# Patient Record
Sex: FEMALE | Race: WHITE | NOT HISPANIC OR LATINO | Employment: FULL TIME | URBAN - METROPOLITAN AREA
[De-identification: names, ages, dates, MRNs, and addresses within clinical notes are randomized per-mention and may not be internally consistent; named-entity substitution may affect disease eponyms.]

---

## 2023-08-24 ENCOUNTER — LAB (OUTPATIENT)
Dept: LAB | Facility: LAB | Age: 58
End: 2023-08-24
Payer: COMMERCIAL

## 2023-08-24 ENCOUNTER — OFFICE VISIT (OUTPATIENT)
Dept: PRIMARY CARE | Facility: CLINIC | Age: 58
End: 2023-08-24
Payer: COMMERCIAL

## 2023-08-24 VITALS
SYSTOLIC BLOOD PRESSURE: 188 MMHG | OXYGEN SATURATION: 95 % | BODY MASS INDEX: 32.02 KG/M2 | WEIGHT: 174 LBS | DIASTOLIC BLOOD PRESSURE: 93 MMHG | HEIGHT: 62 IN | HEART RATE: 92 BPM

## 2023-08-24 DIAGNOSIS — Z12.31 ENCOUNTER FOR SCREENING MAMMOGRAM FOR MALIGNANT NEOPLASM OF BREAST: ICD-10-CM

## 2023-08-24 DIAGNOSIS — R53.83 OTHER FATIGUE: ICD-10-CM

## 2023-08-24 DIAGNOSIS — Z12.11 ENCOUNTER FOR SCREENING FOR MALIGNANT NEOPLASM OF COLON: ICD-10-CM

## 2023-08-24 DIAGNOSIS — E78.5 HYPERLIPIDEMIA, UNSPECIFIED HYPERLIPIDEMIA TYPE: ICD-10-CM

## 2023-08-24 DIAGNOSIS — I10 PRIMARY HYPERTENSION: ICD-10-CM

## 2023-08-24 DIAGNOSIS — I10 PRIMARY HYPERTENSION: Primary | ICD-10-CM

## 2023-08-24 LAB
ALANINE AMINOTRANSFERASE (SGPT) (U/L) IN SER/PLAS: 26 U/L (ref 7–45)
ALBUMIN (G/DL) IN SER/PLAS: 4.5 G/DL (ref 3.4–5)
ALKALINE PHOSPHATASE (U/L) IN SER/PLAS: 101 U/L (ref 33–110)
ANION GAP IN SER/PLAS: 16 MMOL/L (ref 10–20)
ASPARTATE AMINOTRANSFERASE (SGOT) (U/L) IN SER/PLAS: 24 U/L (ref 9–39)
BASOPHILS (10*3/UL) IN BLOOD BY AUTOMATED COUNT: 0.04 X10E9/L (ref 0–0.1)
BASOPHILS/100 LEUKOCYTES IN BLOOD BY AUTOMATED COUNT: 0.4 % (ref 0–2)
BILIRUBIN TOTAL (MG/DL) IN SER/PLAS: 0.5 MG/DL (ref 0–1.2)
CALCIDIOL (25 OH VITAMIN D3) (NG/ML) IN SER/PLAS: 25 NG/ML
CALCIUM (MG/DL) IN SER/PLAS: 9.3 MG/DL (ref 8.6–10.3)
CARBON DIOXIDE, TOTAL (MMOL/L) IN SER/PLAS: 28 MMOL/L (ref 21–32)
CHLORIDE (MMOL/L) IN SER/PLAS: 101 MMOL/L (ref 98–107)
CHOLESTEROL (MG/DL) IN SER/PLAS: 254 MG/DL (ref 0–199)
CHOLESTEROL IN HDL (MG/DL) IN SER/PLAS: 56.9 MG/DL
CHOLESTEROL/HDL RATIO: 4.5
CREATININE (MG/DL) IN SER/PLAS: 0.7 MG/DL (ref 0.5–1.05)
EOSINOPHILS (10*3/UL) IN BLOOD BY AUTOMATED COUNT: 0.18 X10E9/L (ref 0–0.7)
EOSINOPHILS/100 LEUKOCYTES IN BLOOD BY AUTOMATED COUNT: 2 % (ref 0–6)
ERYTHROCYTE DISTRIBUTION WIDTH (RATIO) BY AUTOMATED COUNT: 13.1 % (ref 11.5–14.5)
ERYTHROCYTE MEAN CORPUSCULAR HEMOGLOBIN CONCENTRATION (G/DL) BY AUTOMATED: 33.2 G/DL (ref 32–36)
ERYTHROCYTE MEAN CORPUSCULAR VOLUME (FL) BY AUTOMATED COUNT: 102 FL (ref 80–100)
ERYTHROCYTES (10*6/UL) IN BLOOD BY AUTOMATED COUNT: 4.63 X10E12/L (ref 4–5.2)
GFR FEMALE: >90 ML/MIN/1.73M2
GLUCOSE (MG/DL) IN SER/PLAS: 89 MG/DL (ref 74–99)
HEMATOCRIT (%) IN BLOOD BY AUTOMATED COUNT: 47 % (ref 36–46)
HEMOGLOBIN (G/DL) IN BLOOD: 15.6 G/DL (ref 12–16)
IMMATURE GRANULOCYTES/100 LEUKOCYTES IN BLOOD BY AUTOMATED COUNT: 0.3 % (ref 0–0.9)
LDL: 172 MG/DL (ref 0–99)
LEUKOCYTES (10*3/UL) IN BLOOD BY AUTOMATED COUNT: 9 X10E9/L (ref 4.4–11.3)
LYMPHOCYTES (10*3/UL) IN BLOOD BY AUTOMATED COUNT: 2.17 X10E9/L (ref 1.2–4.8)
LYMPHOCYTES/100 LEUKOCYTES IN BLOOD BY AUTOMATED COUNT: 24.1 % (ref 13–44)
MONOCYTES (10*3/UL) IN BLOOD BY AUTOMATED COUNT: 0.7 X10E9/L (ref 0.1–1)
MONOCYTES/100 LEUKOCYTES IN BLOOD BY AUTOMATED COUNT: 7.8 % (ref 2–10)
NEUTROPHILS (10*3/UL) IN BLOOD BY AUTOMATED COUNT: 5.89 X10E9/L (ref 1.2–7.7)
NEUTROPHILS/100 LEUKOCYTES IN BLOOD BY AUTOMATED COUNT: 65.4 % (ref 40–80)
PLATELETS (10*3/UL) IN BLOOD AUTOMATED COUNT: 333 X10E9/L (ref 150–450)
POTASSIUM (MMOL/L) IN SER/PLAS: 4.6 MMOL/L (ref 3.5–5.3)
PROTEIN TOTAL: 7.6 G/DL (ref 6.4–8.2)
SODIUM (MMOL/L) IN SER/PLAS: 140 MMOL/L (ref 136–145)
THYROTROPIN (MIU/L) IN SER/PLAS BY DETECTION LIMIT <= 0.05 MIU/L: 1.89 MIU/L (ref 0.44–3.98)
TRIGLYCERIDE (MG/DL) IN SER/PLAS: 124 MG/DL (ref 0–149)
UREA NITROGEN (MG/DL) IN SER/PLAS: 10 MG/DL (ref 6–23)
VLDL: 25 MG/DL (ref 0–40)

## 2023-08-24 PROCEDURE — 3080F DIAST BP >= 90 MM HG: CPT | Performed by: NURSE PRACTITIONER

## 2023-08-24 PROCEDURE — 99204 OFFICE O/P NEW MOD 45 MIN: CPT | Performed by: NURSE PRACTITIONER

## 2023-08-24 PROCEDURE — 80053 COMPREHEN METABOLIC PANEL: CPT

## 2023-08-24 PROCEDURE — 80061 LIPID PANEL: CPT

## 2023-08-24 PROCEDURE — 36415 COLL VENOUS BLD VENIPUNCTURE: CPT

## 2023-08-24 PROCEDURE — 85025 COMPLETE CBC W/AUTO DIFF WBC: CPT

## 2023-08-24 PROCEDURE — 84443 ASSAY THYROID STIM HORMONE: CPT

## 2023-08-24 PROCEDURE — 3077F SYST BP >= 140 MM HG: CPT | Performed by: NURSE PRACTITIONER

## 2023-08-24 PROCEDURE — 82306 VITAMIN D 25 HYDROXY: CPT

## 2023-08-24 RX ORDER — LOSARTAN POTASSIUM AND HYDROCHLOROTHIAZIDE 12.5; 5 MG/1; MG/1
1 TABLET ORAL DAILY
Qty: 30 TABLET | Refills: 5 | Status: SHIPPED | OUTPATIENT
Start: 2023-08-24 | End: 2023-09-11

## 2023-08-24 ASSESSMENT — PATIENT HEALTH QUESTIONNAIRE - PHQ9
1. LITTLE INTEREST OR PLEASURE IN DOING THINGS: NOT AT ALL
SUM OF ALL RESPONSES TO PHQ9 QUESTIONS 1 AND 2: 0
2. FEELING DOWN, DEPRESSED OR HOPELESS: NOT AT ALL

## 2023-08-24 ASSESSMENT — ENCOUNTER SYMPTOMS
NAUSEA: 0
ABDOMINAL PAIN: 0
FATIGUE: 0
COUGH: 0
DIZZINESS: 0
MUSCULOSKELETAL NEGATIVE: 1
CONSTIPATION: 0
NUMBNESS: 0
VOMITING: 0
CHILLS: 0
SORE THROAT: 0
WEAKNESS: 0
PSYCHIATRIC NEGATIVE: 1
SHORTNESS OF BREATH: 0
PALPITATIONS: 0
FEVER: 0
DIARRHEA: 0
HEADACHES: 0

## 2023-08-24 NOTE — PATIENT INSTRUCTIONS
Schedule mammogram, colonoscopy, CT cardiac calcium score.  Have ordered fasting labs drawn  Start losartan hydrochlorothiazide as directed  Monitor and record blood pressures for the next 2 weeks.  Bring log to next visit

## 2023-08-24 NOTE — PROGRESS NOTES
"Subjective   Patient ID: Anya Zaragoza is a 58 y.o. female who presents for Establish Care.    HPI   Patient to establish relationship with primary care provider.  Single. 2 grown children. Has 3 grandchildren.  Daily smoker. Weekly alcohol use.   Sedentary life style.  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  Did have Covid and feels she has needed an inhaler since.  Has inhaler does help.  Denies chest pain, SOB, palpitations, dizziness,  or GI issues.  Does report some chest tightness with anxiety.   Has decreased her caffeine intake to mornings only.  Rash on back  Has been diagnosed with Lyme twice in the recent past.  Noted tick bite.           Review of Systems   Constitutional:  Negative for chills, fatigue and fever.   HENT:  Negative for congestion, ear pain and sore throat.    Eyes:  Negative for visual disturbance.   Respiratory:  Negative for cough and shortness of breath.    Cardiovascular:  Negative for chest pain, palpitations and leg swelling.   Gastrointestinal:  Negative for abdominal pain, constipation, diarrhea, nausea and vomiting.   Genitourinary: Negative.    Musculoskeletal: Negative.    Skin:  Negative for rash.   Neurological:  Negative for dizziness, weakness, numbness and headaches.   Psychiatric/Behavioral: Negative.         Objective   BP (!) 188/93   Pulse 92   Ht 1.575 m (5' 2\")   Wt 78.9 kg (174 lb)   SpO2 95%   BMI 31.83 kg/m²     Physical Exam  Constitutional:       General: She is not in acute distress.     Appearance: Normal appearance.   HENT:      Head: Normocephalic and atraumatic.      Right Ear: Tympanic membrane, ear canal and external ear normal.      Left Ear: Tympanic membrane, ear canal and external ear normal.      Nose: Nose normal.      Mouth/Throat:      Mouth: Mucous membranes are moist.      Pharynx: Oropharynx is clear.   Eyes:      Extraocular Movements: Extraocular movements intact.      Conjunctiva/sclera: Conjunctivae normal.      Pupils: Pupils are equal, round, " and reactive to light.   Cardiovascular:      Rate and Rhythm: Normal rate and regular rhythm.      Pulses: Normal pulses.      Heart sounds: Normal heart sounds. No murmur heard.  Pulmonary:      Effort: Pulmonary effort is normal.      Breath sounds: Normal breath sounds. No wheezing, rhonchi or rales.   Abdominal:      General: Bowel sounds are normal.      Palpations: Abdomen is soft.      Tenderness: There is no abdominal tenderness.   Musculoskeletal:         General: Normal range of motion.      Cervical back: Normal range of motion and neck supple.   Lymphadenopathy:      Comments: No lymphadenopathy noted   Skin:     General: Skin is warm and dry.      Findings: No rash.   Neurological:      General: No focal deficit present.      Mental Status: She is alert and oriented to person, place, and time.      Cranial Nerves: No cranial nerve deficit.      Coordination: Coordination normal.      Gait: Gait normal.   Psychiatric:         Mood and Affect: Mood normal.         Behavior: Behavior normal.         Assessment/Plan   Problem List Items Addressed This Visit       Primary hypertension - Primary     Start losartan hydrochlorothiazide 50/12.5 milligrams daily.  Monitor and record blood pressure and log to bring to next appointment  Reduce dietary sodium         Relevant Orders    Comprehensive Metabolic Panel    TSH with reflex to Free T4 if abnormal    Lipid Panel    Vitamin D, Total    CBC and Auto Differential    Hyperlipidemia    Other fatigue    Relevant Orders    Comprehensive Metabolic Panel    TSH with reflex to Free T4 if abnormal    Lipid Panel    Vitamin D, Total    CBC and Auto Differential

## 2023-08-24 NOTE — ASSESSMENT & PLAN NOTE
Start losartan hydrochlorothiazide 50/12.5 milligrams daily.  Monitor and record blood pressure and log to bring to next appointment  Reduce dietary sodium

## 2023-08-24 NOTE — ASSESSMENT & PLAN NOTE
Labs drawn, fast for 12 hours.  May have black coffee water or tea  We will discuss results at next visit  CT cardiac calcium score ordered, will to schedule

## 2023-09-06 ENCOUNTER — TELEPHONE (OUTPATIENT)
Dept: PRIMARY CARE | Facility: CLINIC | Age: 58
End: 2023-09-06
Payer: COMMERCIAL

## 2023-09-06 DIAGNOSIS — R92.8 ABNORMAL MAMMOGRAM: Primary | ICD-10-CM

## 2023-09-06 NOTE — TELEPHONE ENCOUNTER
Called and spoke with Marisela personally regarding mammogram results.  Let her know that ultrasound order was placed.  She knows she needs to call schedule appointment for ultrasound of right breast.

## 2023-09-11 ENCOUNTER — OFFICE VISIT (OUTPATIENT)
Dept: PRIMARY CARE | Facility: CLINIC | Age: 58
End: 2023-09-11
Payer: COMMERCIAL

## 2023-09-11 VITALS
OXYGEN SATURATION: 98 % | HEART RATE: 98 BPM | BODY MASS INDEX: 31.83 KG/M2 | SYSTOLIC BLOOD PRESSURE: 147 MMHG | WEIGHT: 173 LBS | HEIGHT: 62 IN | DIASTOLIC BLOOD PRESSURE: 78 MMHG

## 2023-09-11 DIAGNOSIS — F32.9 REACTIVE DEPRESSION: ICD-10-CM

## 2023-09-11 DIAGNOSIS — E78.5 HYPERLIPIDEMIA, UNSPECIFIED HYPERLIPIDEMIA TYPE: ICD-10-CM

## 2023-09-11 DIAGNOSIS — E55.9 VITAMIN D DEFICIENCY: ICD-10-CM

## 2023-09-11 DIAGNOSIS — I10 PRIMARY HYPERTENSION: Primary | ICD-10-CM

## 2023-09-11 PROBLEM — E50.9 VITAMIN A DEFICIENCY: Status: ACTIVE | Noted: 2023-09-11

## 2023-09-11 PROCEDURE — 3077F SYST BP >= 140 MM HG: CPT | Performed by: NURSE PRACTITIONER

## 2023-09-11 PROCEDURE — 3078F DIAST BP <80 MM HG: CPT | Performed by: NURSE PRACTITIONER

## 2023-09-11 PROCEDURE — 99214 OFFICE O/P EST MOD 30 MIN: CPT | Performed by: NURSE PRACTITIONER

## 2023-09-11 RX ORDER — ROSUVASTATIN CALCIUM 10 MG/1
10 TABLET, COATED ORAL DAILY
Qty: 90 TABLET | Refills: 1 | Status: SHIPPED | OUTPATIENT
Start: 2023-09-11 | End: 2024-03-13

## 2023-09-11 RX ORDER — FLUOXETINE 10 MG/1
10 CAPSULE ORAL DAILY
Qty: 30 CAPSULE | Refills: 1 | Status: SHIPPED | OUTPATIENT
Start: 2023-09-11 | End: 2023-10-06 | Stop reason: SDUPTHER

## 2023-09-11 RX ORDER — CHOLECALCIFEROL (VITAMIN D3) 50 MCG
50 TABLET ORAL DAILY
Qty: 90 TABLET | Refills: 1 | Status: SHIPPED | OUTPATIENT
Start: 2023-09-11

## 2023-09-11 RX ORDER — LOSARTAN POTASSIUM AND HYDROCHLOROTHIAZIDE 25; 100 MG/1; MG/1
1 TABLET ORAL DAILY
Qty: 90 TABLET | Refills: 1 | Status: SHIPPED | OUTPATIENT
Start: 2023-09-11 | End: 2024-03-14

## 2023-09-11 ASSESSMENT — ENCOUNTER SYMPTOMS
FATIGUE: 0
NUMBNESS: 0
PSYCHIATRIC NEGATIVE: 1
HEADACHES: 0
PALPITATIONS: 0
VOMITING: 0
DIZZINESS: 0
WEAKNESS: 0
COUGH: 0
MUSCULOSKELETAL NEGATIVE: 1
DIARRHEA: 0
SHORTNESS OF BREATH: 0
FEVER: 0
SORE THROAT: 0
CHILLS: 0
CONSTIPATION: 0
NAUSEA: 0
ABDOMINAL PAIN: 0

## 2023-09-11 ASSESSMENT — PATIENT HEALTH QUESTIONNAIRE - PHQ9
2. FEELING DOWN, DEPRESSED OR HOPELESS: NOT AT ALL
SUM OF ALL RESPONSES TO PHQ9 QUESTIONS 1 AND 2: 0
1. LITTLE INTEREST OR PLEASURE IN DOING THINGS: NOT AT ALL

## 2023-09-11 NOTE — ASSESSMENT & PLAN NOTE
Stop lower dose of losartan hydrochlorothiazide.  Increase dose to losartan hydrochlorothiazide 100-25 mg daily  Continue to monitor and record blood pressures at home, goal blood pressure consistently less than 135/85

## 2023-09-11 NOTE — PROGRESS NOTES
"Subjective   Patient ID: Marisela Zaragoza is a 58 y.o. female who presents for follow up on BP.      HPI   Work is very stressful.  High expectations at her job right now.  Just log her dog this past week. Had to put him down.  Tearful over stress. Would like to discuss antidepressant for a short time.  Has been checking BP at home.  Having high reading consistently at home. Reviewed log from home. Average 160-170/80/90's.  Denies chest pain, SOB, palpitations, dizziness,  or GI issues.      Review of Systems   Constitutional:  Negative for chills, fatigue and fever.   HENT:  Negative for congestion, ear pain and sore throat.    Eyes:  Negative for visual disturbance.   Respiratory:  Negative for cough and shortness of breath.    Cardiovascular:  Negative for chest pain, palpitations and leg swelling.   Gastrointestinal:  Negative for abdominal pain, constipation, diarrhea, nausea and vomiting.   Genitourinary: Negative.    Musculoskeletal: Negative.    Skin:  Negative for rash.   Neurological:  Negative for dizziness, weakness, numbness and headaches.   Psychiatric/Behavioral: Negative.         Objective   /78   Pulse 98   Ht 1.575 m (5' 2\")   Wt 78.5 kg (173 lb)   SpO2 98%   BMI 31.64 kg/m²     Physical Exam  Constitutional:       General: She is not in acute distress.     Appearance: Normal appearance.   HENT:      Head: Normocephalic and atraumatic.      Right Ear: Tympanic membrane, ear canal and external ear normal.      Left Ear: Tympanic membrane, ear canal and external ear normal.      Nose: Nose normal.      Mouth/Throat:      Mouth: Mucous membranes are moist.      Pharynx: Oropharynx is clear.   Eyes:      Extraocular Movements: Extraocular movements intact.      Conjunctiva/sclera: Conjunctivae normal.      Pupils: Pupils are equal, round, and reactive to light.   Cardiovascular:      Rate and Rhythm: Normal rate and regular rhythm.      Pulses: Normal pulses.      Heart sounds: Normal heart " sounds. No murmur heard.  Pulmonary:      Effort: Pulmonary effort is normal.      Breath sounds: Normal breath sounds. No wheezing, rhonchi or rales.   Abdominal:      General: Bowel sounds are normal.      Palpations: Abdomen is soft.      Tenderness: There is no abdominal tenderness.   Musculoskeletal:         General: Normal range of motion.      Cervical back: Normal range of motion and neck supple.   Lymphadenopathy:      Comments: No lymphadenopathy noted   Skin:     General: Skin is warm and dry.      Findings: No rash.   Neurological:      General: No focal deficit present.      Mental Status: She is alert and oriented to person, place, and time.      Cranial Nerves: No cranial nerve deficit.      Coordination: Coordination normal.      Gait: Gait normal.   Psychiatric:         Mood and Affect: Mood normal.         Behavior: Behavior normal.         Assessment/Plan   Problem List Items Addressed This Visit       Primary hypertension - Primary     Stop lower dose of losartan hydrochlorothiazide.  Increase dose to losartan hydrochlorothiazide 100-25 mg daily  Continue to monitor and record blood pressures at home, goal blood pressure consistently less than 135/85         Relevant Medications    cholecalciferol (Vitamin D-3) 50 MCG (2000 UT) tablet    rosuvastatin (Crestor) 10 mg tablet    losartan-hydrochlorothiazide (Hyzaar) 100-25 mg tablet    Hyperlipidemia     Start rosuvastatin 10 mg daily.  Watch diet saturated fats and calories         Relevant Medications    cholecalciferol (Vitamin D-3) 50 MCG (2000 UT) tablet    rosuvastatin (Crestor) 10 mg tablet    Vitamin D deficiency     Start vitamin D 2000 unit capsule daily.  May need to get over-the-counter         Relevant Medications    cholecalciferol (Vitamin D-3) 50 MCG (2000 UT) tablet    Reactive depression     Start Prozac 10 mg daily.          Relevant Medications    FLUoxetine (PROzac) 10 mg capsule   Continue cortisone cream to upper back rash.   Seems to be healing well  Breast US scheduled for Thursday 9/14.  Follow-up 4 weeks for Prozac medication check

## 2023-09-11 NOTE — PATIENT INSTRUCTIONS
Start vitamin D 2000 unit capsule daily May need to get over-the-counter.  Increase dose of losartan hydrochlorothiazide to 100-25 mg daily.  May take 2 tablets of current dose to equal new dose until gone.  Continue to monitor and log blood pressures at home.    Have ultrasound of breast done as scheduled  Start Prozac 10 mg daily.  Follow-up in 4 weeks for medication check

## 2023-10-06 ENCOUNTER — OFFICE VISIT (OUTPATIENT)
Dept: PRIMARY CARE | Facility: CLINIC | Age: 58
End: 2023-10-06
Payer: COMMERCIAL

## 2023-10-06 VITALS
WEIGHT: 170 LBS | HEART RATE: 77 BPM | HEIGHT: 62 IN | BODY MASS INDEX: 31.28 KG/M2 | OXYGEN SATURATION: 97 % | DIASTOLIC BLOOD PRESSURE: 80 MMHG | SYSTOLIC BLOOD PRESSURE: 132 MMHG

## 2023-10-06 DIAGNOSIS — E55.9 VITAMIN D DEFICIENCY: ICD-10-CM

## 2023-10-06 DIAGNOSIS — I10 PRIMARY HYPERTENSION: Primary | ICD-10-CM

## 2023-10-06 DIAGNOSIS — F32.9 REACTIVE DEPRESSION: ICD-10-CM

## 2023-10-06 DIAGNOSIS — K21.9 GASTROESOPHAGEAL REFLUX DISEASE WITHOUT ESOPHAGITIS: ICD-10-CM

## 2023-10-06 DIAGNOSIS — E78.5 HYPERLIPIDEMIA, UNSPECIFIED HYPERLIPIDEMIA TYPE: ICD-10-CM

## 2023-10-06 DIAGNOSIS — R53.83 OTHER FATIGUE: ICD-10-CM

## 2023-10-06 PROCEDURE — 99214 OFFICE O/P EST MOD 30 MIN: CPT | Performed by: NURSE PRACTITIONER

## 2023-10-06 PROCEDURE — 3079F DIAST BP 80-89 MM HG: CPT | Performed by: NURSE PRACTITIONER

## 2023-10-06 PROCEDURE — 3075F SYST BP GE 130 - 139MM HG: CPT | Performed by: NURSE PRACTITIONER

## 2023-10-06 RX ORDER — FLUOXETINE HYDROCHLORIDE 20 MG/1
20 CAPSULE ORAL DAILY
Qty: 90 CAPSULE | Refills: 1 | Status: SHIPPED | OUTPATIENT
Start: 2023-10-06 | End: 2024-01-03 | Stop reason: WASHOUT

## 2023-10-06 RX ORDER — OMEPRAZOLE 20 MG/1
20 CAPSULE, DELAYED RELEASE ORAL DAILY
Qty: 30 CAPSULE | Refills: 5 | Status: SHIPPED | OUTPATIENT
Start: 2023-10-06 | End: 2024-04-01

## 2023-10-06 ASSESSMENT — ENCOUNTER SYMPTOMS
SHORTNESS OF BREATH: 0
ABDOMINAL PAIN: 0
CHILLS: 0
PALPITATIONS: 0
DIARRHEA: 0
MUSCULOSKELETAL NEGATIVE: 1
FEVER: 0
NUMBNESS: 0
SORE THROAT: 0
VOMITING: 0
PSYCHIATRIC NEGATIVE: 1
DIZZINESS: 0
CONSTIPATION: 0
NAUSEA: 0
HEADACHES: 0
WEAKNESS: 0
COUGH: 0
FATIGUE: 0

## 2023-10-06 NOTE — PROGRESS NOTES
"Subjective   Patient ID: Marisela Zaragoza is a 58 y.o. female who presents for follow up.      HPI   Still feeling stressed from her job.  Would like to increase Prozac.  Appointment for cardiac calcium score October 24.  Discussed elevated lipids  Would like to stop smoking she has called the smoking hotline.  Waiting for patches to arrive.  Denies chest pain, SOB, palpitations, dizziness,  or GI issues.  Has cut out all red meat and hands are not as swollen or as painful.  Has an appointment for repeat breast ultrasound in March for 6-month follow-up suggested by radiology      Review of Systems   Constitutional:  Negative for chills, fatigue and fever.   HENT:  Negative for congestion, ear pain and sore throat.    Eyes:  Negative for visual disturbance.   Respiratory:  Negative for cough and shortness of breath.    Cardiovascular:  Negative for chest pain, palpitations and leg swelling.   Gastrointestinal:  Negative for abdominal pain, constipation, diarrhea, nausea and vomiting.   Genitourinary: Negative.    Musculoskeletal: Negative.    Skin:  Negative for rash.   Neurological:  Negative for dizziness, weakness, numbness and headaches.   Psychiatric/Behavioral: Negative.         Objective   /80   Pulse 77   Ht 1.575 m (5' 2\")   Wt 77.1 kg (170 lb)   SpO2 97%   BMI 31.09 kg/m²     Physical Exam  Constitutional:       General: She is not in acute distress.     Appearance: Normal appearance.   HENT:      Head: Normocephalic and atraumatic.      Right Ear: Tympanic membrane, ear canal and external ear normal.      Left Ear: Tympanic membrane, ear canal and external ear normal.      Nose: Nose normal.      Mouth/Throat:      Mouth: Mucous membranes are moist.      Pharynx: Oropharynx is clear.   Eyes:      Extraocular Movements: Extraocular movements intact.      Conjunctiva/sclera: Conjunctivae normal.      Pupils: Pupils are equal, round, and reactive to light.   Cardiovascular:      Rate and Rhythm: " Normal rate and regular rhythm.      Pulses: Normal pulses.      Heart sounds: Normal heart sounds. No murmur heard.  Pulmonary:      Effort: Pulmonary effort is normal.      Breath sounds: Normal breath sounds. No wheezing, rhonchi or rales.   Abdominal:      General: Bowel sounds are normal.      Palpations: Abdomen is soft.      Tenderness: There is no abdominal tenderness.   Musculoskeletal:         General: Normal range of motion.      Cervical back: Normal range of motion and neck supple.   Lymphadenopathy:      Comments: No lymphadenopathy noted   Skin:     General: Skin is warm and dry.      Findings: No rash.   Neurological:      General: No focal deficit present.      Mental Status: She is alert and oriented to person, place, and time.      Cranial Nerves: No cranial nerve deficit.      Coordination: Coordination normal.      Gait: Gait normal.   Psychiatric:         Mood and Affect: Mood normal.         Behavior: Behavior normal.         Assessment/Plan   Problem List Items Addressed This Visit             ICD-10-CM    Primary hypertension - Primary I10     Continue current losartan hydrochlorothiazide for blood pressure         Hyperlipidemia E78.5     Continue rosuvastatin  Have cardiac calcium score test done in October for elevated lipids         Other fatigue R53.83    Vitamin D deficiency E55.9     Continue vitamin D supplement.  Suggest calcium with vitamin D capsule supplement         Reactive depression F32.9     Increase Prozac to 20 mg daily         Relevant Medications    FLUoxetine (PROzac) 20 mg capsule    Gastroesophageal reflux disease without esophagitis K21.9     Start omeprazole as prescribed         Relevant Medications    omeprazole (PriLOSEC) 20 mg DR capsule     Mammogram: mammogram up to date. Repeat breast ultrasound 3/2024  Colonoscopy: needs to schedule  Flu: defers  Shingle  Pneumonia  Follow up in 1 year or sooner if needed, repeat lab panel at that time

## 2023-10-24 ENCOUNTER — ANCILLARY PROCEDURE (OUTPATIENT)
Dept: RADIOLOGY | Facility: CLINIC | Age: 58
End: 2023-10-24
Payer: COMMERCIAL

## 2023-10-24 DIAGNOSIS — E78.5 HYPERLIPIDEMIA, UNSPECIFIED: ICD-10-CM

## 2023-10-24 PROCEDURE — 75571 CT HRT W/O DYE W/CA TEST: CPT

## 2023-10-30 ENCOUNTER — TELEPHONE (OUTPATIENT)
Dept: PRIMARY CARE | Facility: CLINIC | Age: 58
End: 2023-10-30
Payer: COMMERCIAL

## 2023-10-30 DIAGNOSIS — E78.5 HYPERLIPIDEMIA, UNSPECIFIED HYPERLIPIDEMIA TYPE: Primary | ICD-10-CM

## 2023-10-30 DIAGNOSIS — R93.1 ABNORMAL SCREENING CARDIAC CT: ICD-10-CM

## 2023-10-30 NOTE — TELEPHONE ENCOUNTER
Called and spoke with patient regarding CT cardiac calcium score >200  Higher risk with elevated lipids.  Continue Rosuvastatin and placed referral for cardiology for eval and treatment.

## 2024-01-03 ENCOUNTER — OFFICE VISIT (OUTPATIENT)
Dept: CARDIOLOGY | Facility: HOSPITAL | Age: 59
End: 2024-01-03
Payer: COMMERCIAL

## 2024-01-03 VITALS
SYSTOLIC BLOOD PRESSURE: 151 MMHG | HEART RATE: 86 BPM | OXYGEN SATURATION: 95 % | WEIGHT: 173.28 LBS | DIASTOLIC BLOOD PRESSURE: 81 MMHG | BODY MASS INDEX: 31.69 KG/M2

## 2024-01-03 DIAGNOSIS — I10 PRIMARY HYPERTENSION: Primary | ICD-10-CM

## 2024-01-03 DIAGNOSIS — R00.2 PALPITATIONS: ICD-10-CM

## 2024-01-03 DIAGNOSIS — R93.1 AGATSTON CAC SCORE 200-399: ICD-10-CM

## 2024-01-03 DIAGNOSIS — R07.89 CHEST TIGHTNESS: ICD-10-CM

## 2024-01-03 PROCEDURE — 93010 ELECTROCARDIOGRAM REPORT: CPT | Performed by: INTERNAL MEDICINE

## 2024-01-03 PROCEDURE — 93005 ELECTROCARDIOGRAM TRACING: CPT | Performed by: INTERNAL MEDICINE

## 2024-01-03 PROCEDURE — 3077F SYST BP >= 140 MM HG: CPT | Performed by: INTERNAL MEDICINE

## 2024-01-03 PROCEDURE — 99214 OFFICE O/P EST MOD 30 MIN: CPT | Performed by: INTERNAL MEDICINE

## 2024-01-03 PROCEDURE — 99204 OFFICE O/P NEW MOD 45 MIN: CPT | Performed by: INTERNAL MEDICINE

## 2024-01-03 PROCEDURE — 3079F DIAST BP 80-89 MM HG: CPT | Performed by: INTERNAL MEDICINE

## 2024-01-03 RX ORDER — ASPIRIN 81 MG/1
81 TABLET ORAL DAILY
Qty: 30 TABLET | Refills: 11
Start: 2024-01-03 | End: 2025-01-02

## 2024-01-03 NOTE — PROGRESS NOTES
Referred by ACNMICHELLE Keller for New Patient Visit (Elevated CT calcium score)     History Of Present Illness:    Marisela Zaragoza is a 58 y.o. female with h/o hypertension and dyslipidemia presenting today to the [facility] to establish cardiac care and to discuss recent elevated CCS (total 205.79 10/24/2023).  States that she has had htn and dyslipidemia for many years, but was unable to seek medical care due to lack of health insurance until this past summer.  She has now been taking crestor and losartan-hydrochlorothiazide since August without issue.  Does report having intermittent chest tightness that occurs both at rest as well as with exertion. Also reports having intermittent palpitations described as fluttering.  Palpitations have improved somewhat since she has reduced caffeine intake.  She also quite smoking cigarettes in November.       Past Medical History:  She has no past medical history on file.    Past Surgical History:  She has no past surgical history on file.      Social History:  She reports that she has been smoking cigarettes. She has a 11.25 pack-year smoking history. She has never used smokeless tobacco. She reports current alcohol use. She reports current drug use. Drug: Marijuana.    Family History:  No family history on file.     Allergies:  Penicillin, Sulfa (sulfonamide antibiotics), Tetanus toxoid, and Codeine    Outpatient Medications:  Current Outpatient Medications   Medication Instructions    aspirin 81 mg, oral, Daily    cholecalciferol (VITAMIN D-3) 50 mcg, oral, Daily    losartan-hydrochlorothiazide (Hyzaar) 100-25 mg tablet 1 tablet, oral, Daily    omeprazole (PRILOSEC) 20 mg, oral, Daily, Do not crush or chew.    rosuvastatin (CRESTOR) 10 mg, oral, Daily        Last Recorded Vitals:  Vitals:    01/03/24 0830   BP: 151/81   Pulse: 86   SpO2: 95%   Weight: 78.6 kg (173 lb 4.5 oz)       Physical Exam:  Constitutional: Pleasant, Awake/Alert/Oriented to  "person place and time. No distress  Head: Atraumatic, Normocephalic  Eyes: EOMI. CLIFF  Neck: No JVD  Cardiovascular: Regular rate and rhythm, S1, S2. No extra heart sounds or murmurs  Respiratory: Clear to auscultation bilaterally. No wheezing, rales or rhonchi. Good chest wall expansion  Abdomen: Soft, Nontender. Bowel sounds appreciated  Musculoskeletal: ROM intact. Muscle strength grossly intact upper and lower extremities 5/5.   Neurological: CNII-XII intact. Sensation grossly intact  Extremities: Warm and dry. No acute rashes and lesions  Psychiatric: Appropriate mood and affect         Last Labs:  CBC -  Lab Results   Component Value Date    WBC 9.0 08/24/2023    HGB 15.6 08/24/2023    HCT 47.0 (H) 08/24/2023     (H) 08/24/2023     08/24/2023       CMP -  Lab Results   Component Value Date    CALCIUM 9.3 08/24/2023    PROT 7.6 08/24/2023    ALBUMIN 4.5 08/24/2023    AST 24 08/24/2023    ALT 26 08/24/2023    ALKPHOS 101 08/24/2023    BILITOT 0.5 08/24/2023       LIPID PANEL -   Lab Results   Component Value Date    CHOL 254 (H) 08/24/2023    TRIG 124 08/24/2023    HDL 56.9 08/24/2023    CHHDL 4.5 08/24/2023    LDLF 172 (H) 08/24/2023    VLDL 25 08/24/2023       RENAL FUNCTION PANEL -   Lab Results   Component Value Date    GLUCOSE 89 08/24/2023     08/24/2023    K 4.6 08/24/2023     08/24/2023    CO2 28 08/24/2023    ANIONGAP 16 08/24/2023    BUN 10 08/24/2023    CREATININE 0.70 08/24/2023    CALCIUM 9.3 08/24/2023    ALBUMIN 4.5 08/24/2023        No results found for: \"BNP\", \"HGBA1C\"    Last Cardiology Tests:  ECG:  NSR, HR 75bpm     Cardiac Imaging:  CT cardiac scoring wo IV contrast 10/24/2023    FINDINGS:  The score and distribution of calcium in the coronary arteries is as  follows:      LM 0,  LAD 99.39,  LCx 0,  .4,      Total 205.79      The visualized ascending thoracic aorta measures 3.5 cm in diameter.  The heart is normal in size. No pericardial effusion is " present.      No gross evidence of mediastinal or hilar lymphadenopathy or masses  is identified. The visualized segments of the lungs are normally  expanded.      The main pulmonary artery, right and left pulmonary artery are normal  in size.      The visualized subdiaphragmatic structures appear intact.    Lab review: I have personally reviewed the laboratory result(s)   Diagnostic review: I have personally reviewed the result(s) of the CCS     Assessment/Plan   Very pleasant 58 y.o. female with h/o hypertension and dyslipidemia presenting today to the [facility] to establish cardiac care and to discuss recent elevated CCS (total 205.79 10/24/2023).  Reports having intermittent chest tightness as well as palpitations described as fluttering sensation.     Plan:  -Recommend exercise nuclear stress test to differentiate stress induced ischemia  -Recommend further ambulatory cardiac monitoring for assessment of arrhythmia  -Continue losartan-hydrochlorothiazide and crestor at current dosing  -Start aspirin 81mg daily  -Follow up in 6-8wks to discuss above testing results. Consider adding CCB pending zio results.        SHALINI Potter-CNP  Michael Mccann MD

## 2024-01-19 ENCOUNTER — HOSPITAL ENCOUNTER (OUTPATIENT)
Dept: CARDIOLOGY | Facility: HOSPITAL | Age: 59
Discharge: HOME | End: 2024-01-19
Payer: COMMERCIAL

## 2024-01-19 ENCOUNTER — HOSPITAL ENCOUNTER (OUTPATIENT)
Dept: RADIOLOGY | Facility: HOSPITAL | Age: 59
Discharge: HOME | End: 2024-01-19
Payer: COMMERCIAL

## 2024-01-19 DIAGNOSIS — R00.2 PALPITATIONS: ICD-10-CM

## 2024-01-19 DIAGNOSIS — R07.89 CHEST TIGHTNESS: ICD-10-CM

## 2024-01-19 PROCEDURE — 3430000001 HC RX 343 DIAGNOSTIC RADIOPHARMACEUTICALS: Performed by: NURSE PRACTITIONER

## 2024-01-19 PROCEDURE — 93016 CV STRESS TEST SUPVJ ONLY: CPT | Performed by: STUDENT IN AN ORGANIZED HEALTH CARE EDUCATION/TRAINING PROGRAM

## 2024-01-19 PROCEDURE — 93017 CV STRESS TEST TRACING ONLY: CPT

## 2024-01-19 PROCEDURE — 78452 HT MUSCLE IMAGE SPECT MULT: CPT

## 2024-01-19 PROCEDURE — 93248 EXT ECG>7D<15D REV&INTERPJ: CPT | Performed by: STUDENT IN AN ORGANIZED HEALTH CARE EDUCATION/TRAINING PROGRAM

## 2024-01-19 PROCEDURE — A9502 TC99M TETROFOSMIN: HCPCS | Performed by: NURSE PRACTITIONER

## 2024-01-19 PROCEDURE — 93246 EXT ECG>7D<15D RECORDING: CPT

## 2024-01-19 PROCEDURE — 78452 HT MUSCLE IMAGE SPECT MULT: CPT | Performed by: RADIOLOGY

## 2024-01-19 RX ADMIN — TETROFOSMIN 10.3 MILLICURIE: 0.23 INJECTION, POWDER, LYOPHILIZED, FOR SOLUTION INTRAVENOUS at 08:15

## 2024-01-19 RX ADMIN — TETROFOSMIN 34.8 MILLICURIE: 0.23 INJECTION, POWDER, LYOPHILIZED, FOR SOLUTION INTRAVENOUS at 09:38

## 2024-02-13 ENCOUNTER — OFFICE VISIT (OUTPATIENT)
Dept: CARDIOLOGY | Facility: HOSPITAL | Age: 59
End: 2024-02-13
Payer: COMMERCIAL

## 2024-02-13 VITALS
BODY MASS INDEX: 32.38 KG/M2 | HEART RATE: 93 BPM | OXYGEN SATURATION: 94 % | DIASTOLIC BLOOD PRESSURE: 78 MMHG | SYSTOLIC BLOOD PRESSURE: 127 MMHG | WEIGHT: 177.03 LBS

## 2024-02-13 DIAGNOSIS — R00.2 PALPITATIONS: Primary | ICD-10-CM

## 2024-02-13 PROCEDURE — 3078F DIAST BP <80 MM HG: CPT | Performed by: NURSE PRACTITIONER

## 2024-02-13 PROCEDURE — 3074F SYST BP LT 130 MM HG: CPT | Performed by: NURSE PRACTITIONER

## 2024-02-13 PROCEDURE — 99214 OFFICE O/P EST MOD 30 MIN: CPT | Performed by: NURSE PRACTITIONER

## 2024-02-13 RX ORDER — DILTIAZEM HYDROCHLORIDE 120 MG/1
120 CAPSULE, COATED, EXTENDED RELEASE ORAL DAILY
Qty: 30 CAPSULE | Refills: 11 | Status: SHIPPED | OUTPATIENT
Start: 2024-02-13 | End: 2024-02-29 | Stop reason: ALTCHOICE

## 2024-02-14 NOTE — PROGRESS NOTES
Follow up, discuss test results      History Of Present Illness:    Marisela Zaragoza is a 58 y.o. female with h/o hypertension, dyslipidemia, elevated CCS (205.79 10/24/2023), and palpitations presenting today for follow up.  Continues to have both heart pounding and heart racing palpitations that occur almost daily.        Past Medical History:  She has no past medical history on file.    Past Surgical History:  She has no past surgical history on file.      Social History:  She reports that she has been smoking cigarettes. She has a 11.25 pack-year smoking history. She has never used smokeless tobacco. She reports current alcohol use. She reports current drug use. Drug: Marijuana.    Family History:  No family history on file.     Allergies:  Penicillin, Sulfa (sulfonamide antibiotics), Tetanus toxoid, and Codeine    Outpatient Medications:  Current Outpatient Medications   Medication Instructions    aspirin 81 mg, oral, Daily    cholecalciferol (VITAMIN D-3) 50 mcg, oral, Daily    dilTIAZem CD (CARDIZEM CD) 120 mg, oral, Daily    losartan-hydrochlorothiazide (Hyzaar) 100-25 mg tablet 1 tablet, oral, Daily    omeprazole (PRILOSEC) 20 mg, oral, Daily, Do not crush or chew.    rosuvastatin (CRESTOR) 10 mg, oral, Daily        Last Recorded Vitals:  Vitals:    02/13/24 1525   BP: 127/78   Pulse: 93   SpO2: 94%   Weight: 80.3 kg (177 lb 0.5 oz)       Physical Exam:  Constitutional: Pleasant, Awake/Alert/Oriented to person place and time. No distress  Head: Atraumatic, Normocephalic  Eyes: EOMI. CLIFF  Neck: No JVD  Cardiovascular: Regular rate and rhythm, S1, S2. No extra heart sounds or murmurs  Respiratory: Clear to auscultation bilaterally. No wheezing, rales or rhonchi. Good chest wall expansion  Abdomen: Soft, Nontender. Bowel sounds appreciated  Musculoskeletal: ROM intact. Muscle strength grossly intact upper and lower extremities 5/5.   Neurological: CNII-XII intact. Sensation grossly intact  Extremities: Warm and  "dry. No acute rashes and lesions  Psychiatric: Appropriate mood and affect         Last Labs:  CBC -  Lab Results   Component Value Date    WBC 9.0 08/24/2023    HGB 15.6 08/24/2023    HCT 47.0 (H) 08/24/2023     (H) 08/24/2023     08/24/2023       CMP -  Lab Results   Component Value Date    CALCIUM 9.3 08/24/2023    PROT 7.6 08/24/2023    ALBUMIN 4.5 08/24/2023    AST 24 08/24/2023    ALT 26 08/24/2023    ALKPHOS 101 08/24/2023    BILITOT 0.5 08/24/2023       LIPID PANEL -   Lab Results   Component Value Date    CHOL 254 (H) 08/24/2023    TRIG 124 08/24/2023    HDL 56.9 08/24/2023    CHHDL 4.5 08/24/2023    LDLF 172 (H) 08/24/2023    VLDL 25 08/24/2023       RENAL FUNCTION PANEL -   Lab Results   Component Value Date    GLUCOSE 89 08/24/2023     08/24/2023    K 4.6 08/24/2023     08/24/2023    CO2 28 08/24/2023    ANIONGAP 16 08/24/2023    BUN 10 08/24/2023    CREATININE 0.70 08/24/2023    CALCIUM 9.3 08/24/2023    ALBUMIN 4.5 08/24/2023        No results found for: \"BNP\", \"HGBA1C\"    Last Cardiology Tests:    Nuclear stress test:  1/19/2024  IMPRESSION:  1.  Normal myocardial perfusion study without evidence of ischemia or  prior infarction.  2. The left ventricle is normal in size.  3. Normal LV wall motion with an LV EF estimated at greater than 65%.    Zio   1/19/2024-2/1/2024  Min HR 64, max HR 200bpm, avg HR 91bpm  1 VT occurred lasting 10.6 seconds  21 SVT-- fastest event lasting 10 beats at 200bpm, longest lasting 17 beats with avg HR 128bpm    Cardiac Imaging:  CT cardiac scoring wo IV contrast 10/24/2023    FINDINGS:  The score and distribution of calcium in the coronary arteries is as  follows:      LM 0,  LAD 99.39,  LCx 0,  .4,      Total 205.79      The visualized ascending thoracic aorta measures 3.5 cm in diameter.  The heart is normal in size. No pericardial effusion is present.      No gross evidence of mediastinal or hilar lymphadenopathy or masses  is " identified. The visualized segments of the lungs are normally  expanded.      The main pulmonary artery, right and left pulmonary artery are normal  in size.      The visualized subdiaphragmatic structures appear intact.    Lab review: I have personally reviewed the laboratory result(s)   Diagnostic review: I have personally reviewed the result(s) of the CCS     Assessment/Plan   Very pleasant 58 y.o. female with h/o hypertension, dyslipidemia, elevated CCS (205.79 10/24/2023), and palpitations presenting today for follow up.  Stress test negative for ischemia.  Zio shows pSVT and 1 event of ?VT vs. pSVT with aberrancy.  Continues to have daily palpitations.     Plan:  -Recommend starting diltiazem 120mg daily  -Continue aspirin, cresor, losartan-hydrochlorothiazide  -Follow up with Dr. Gomez for further evaluation of possible non-sustained VT with event lasting 10.6 seconds and symptomatic  -Will obtain an echocardiogram for evaluation of mechanical and structural function   -Follow up with general cardiology pending EP evaluation      SHALINI Potter-CNP

## 2024-02-17 LAB
ATRIAL RATE: 75 BPM
P AXIS: 67 DEGREES
P OFFSET: 200 MS
P ONSET: 149 MS
PR INTERVAL: 140 MS
Q ONSET: 219 MS
QRS COUNT: 12 BEATS
QRS DURATION: 66 MS
QT INTERVAL: 412 MS
QTC CALCULATION(BAZETT): 460 MS
QTC FREDERICIA: 443 MS
R AXIS: 62 DEGREES
T AXIS: 38 DEGREES
T OFFSET: 425 MS
VENTRICULAR RATE: 75 BPM

## 2024-02-22 ENCOUNTER — HOSPITAL ENCOUNTER (OUTPATIENT)
Dept: CARDIOLOGY | Facility: HOSPITAL | Age: 59
Discharge: HOME | End: 2024-02-22
Payer: COMMERCIAL

## 2024-02-22 DIAGNOSIS — R00.2 PALPITATIONS: ICD-10-CM

## 2024-02-22 PROCEDURE — 93306 TTE W/DOPPLER COMPLETE: CPT

## 2024-02-22 PROCEDURE — 93306 TTE W/DOPPLER COMPLETE: CPT | Performed by: INTERNAL MEDICINE

## 2024-02-23 LAB
AORTIC VALVE MEAN GRADIENT: 5.3 MMHG
AORTIC VALVE PEAK VELOCITY: 1.53 M/S
AV PEAK GRADIENT: 9.3 MMHG
AVA (PEAK VEL): 1.91 CM2
AVA (VTI): 1.78 CM2
EJECTION FRACTION APICAL 4 CHAMBER: 51
EJECTION FRACTION: 50 %
LEFT VENTRICLE INTERNAL DIMENSION DIASTOLE: 3.83 CM (ref 3.5–6)
LEFT VENTRICULAR OUTFLOW TRACT DIAMETER: 1.77 CM
MITRAL VALVE E/A RATIO: 0.97
RIGHT VENTRICLE FREE WALL PEAK S': 15 CM/S
RIGHT VENTRICLE PEAK SYSTOLIC PRESSURE: 27.1 MMHG

## 2024-02-29 ENCOUNTER — OFFICE VISIT (OUTPATIENT)
Dept: CARDIOLOGY | Facility: HOSPITAL | Age: 59
End: 2024-02-29
Payer: COMMERCIAL

## 2024-02-29 VITALS
HEART RATE: 90 BPM | OXYGEN SATURATION: 98 % | DIASTOLIC BLOOD PRESSURE: 78 MMHG | BODY MASS INDEX: 35.08 KG/M2 | SYSTOLIC BLOOD PRESSURE: 144 MMHG | WEIGHT: 191.8 LBS

## 2024-02-29 DIAGNOSIS — R53.83 OTHER FATIGUE: ICD-10-CM

## 2024-02-29 DIAGNOSIS — R00.2 PALPITATIONS: ICD-10-CM

## 2024-02-29 DIAGNOSIS — I47.20 VT (VENTRICULAR TACHYCARDIA) (MULTI): ICD-10-CM

## 2024-02-29 DIAGNOSIS — I10 PRIMARY HYPERTENSION: Primary | ICD-10-CM

## 2024-02-29 LAB
ATRIAL RATE: 85 BPM
P AXIS: 72 DEGREES
P OFFSET: 196 MS
P ONSET: 150 MS
PR INTERVAL: 138 MS
Q ONSET: 219 MS
QRS COUNT: 14 BEATS
QRS DURATION: 68 MS
QT INTERVAL: 398 MS
QTC CALCULATION(BAZETT): 473 MS
QTC FREDERICIA: 447 MS
R AXIS: 59 DEGREES
T AXIS: 36 DEGREES
T OFFSET: 418 MS
VENTRICULAR RATE: 85 BPM

## 2024-02-29 PROCEDURE — 93005 ELECTROCARDIOGRAM TRACING: CPT | Performed by: STUDENT IN AN ORGANIZED HEALTH CARE EDUCATION/TRAINING PROGRAM

## 2024-02-29 PROCEDURE — 3078F DIAST BP <80 MM HG: CPT | Performed by: STUDENT IN AN ORGANIZED HEALTH CARE EDUCATION/TRAINING PROGRAM

## 2024-02-29 PROCEDURE — 3077F SYST BP >= 140 MM HG: CPT | Performed by: STUDENT IN AN ORGANIZED HEALTH CARE EDUCATION/TRAINING PROGRAM

## 2024-02-29 PROCEDURE — 99214 OFFICE O/P EST MOD 30 MIN: CPT | Mod: 25 | Performed by: STUDENT IN AN ORGANIZED HEALTH CARE EDUCATION/TRAINING PROGRAM

## 2024-02-29 PROCEDURE — 99214 OFFICE O/P EST MOD 30 MIN: CPT | Performed by: STUDENT IN AN ORGANIZED HEALTH CARE EDUCATION/TRAINING PROGRAM

## 2024-02-29 PROCEDURE — 93010 ELECTROCARDIOGRAM REPORT: CPT | Performed by: STUDENT IN AN ORGANIZED HEALTH CARE EDUCATION/TRAINING PROGRAM

## 2024-02-29 RX ORDER — METOPROLOL SUCCINATE 50 MG/1
50 TABLET, EXTENDED RELEASE ORAL DAILY
Qty: 60 TABLET | Refills: 5 | Status: SHIPPED | OUTPATIENT
Start: 2024-02-29 | End: 2024-04-05 | Stop reason: SDUPTHER

## 2024-02-29 ASSESSMENT — ENCOUNTER SYMPTOMS
DIZZINESS: 1
PALPITATIONS: 1
SHORTNESS OF BREATH: 0
FEVER: 0
CONFUSION: 0

## 2024-02-29 NOTE — PROGRESS NOTES
Referred by Dr. Avila for No chief complaint on file.     History Of Present Illness:    Marisela Zaragoza is a 58 y.o. female with PMH of HTN, HLD, elevated CCS (205.79 10/24/2023), referred to EP due to palpitations and abnormal Zio.  Patient complains of heart pounding and heart racing palpitations that occur almost daily. It can last up to 20-30 minutes. She reports lightheadedness but denies syncope. It started after she had COVID back in 2021. She also reports anxiety.    Past Medical History:  She has no past medical history on file.    Past Surgical History:  She has no past surgical history on file.      Social History:  She reports that she has been smoking cigarettes. She has a 11.25 pack-year smoking history. She has never used smokeless tobacco. She reports current alcohol use. She reports current drug use. Drug: Marijuana.    Family History:  No family history on file.     Allergies:  Penicillin, Sulfa (sulfonamide antibiotics), Tetanus toxoid, and Codeine    Outpatient Medications:  Current Outpatient Medications   Medication Instructions    aspirin 81 mg, oral, Daily    cholecalciferol (VITAMIN D-3) 50 mcg, oral, Daily    dilTIAZem CD (CARDIZEM CD) 120 mg, oral, Daily    losartan-hydrochlorothiazide (Hyzaar) 100-25 mg tablet 1 tablet, oral, Daily    omeprazole (PRILOSEC) 20 mg, oral, Daily, Do not crush or chew.    rosuvastatin (CRESTOR) 10 mg, oral, Daily        Last Recorded Vitals:  Vitals:    02/29/24 1510   BP: 144/78   Pulse: 90   SpO2: 98%   Weight: 87 kg (191 lb 12.8 oz)     Review of Systems   Constitutional:  Negative for fever.   Respiratory:  Negative for shortness of breath.    Cardiovascular:  Positive for palpitations. Negative for chest pain and leg swelling.        As per history.   Neurological:  Positive for dizziness. Negative for syncope.   Psychiatric/Behavioral:  Negative for confusion.         Physical Exam  Constitutional:       Appearance: Normal appearance.    Cardiovascular:      Rate and Rhythm: Normal rate and regular rhythm.      Heart sounds: No murmur heard.     No friction rub. No gallop.   Pulmonary:      Effort: Pulmonary effort is normal.      Breath sounds: Normal breath sounds.   Abdominal:      Palpations: Abdomen is soft.   Musculoskeletal:      Cervical back: Neck supple.   Neurological:      Mental Status: She is alert.   Psychiatric:         Mood and Affect: Mood normal.         Behavior: Behavior normal.          Last Labs:  CBC -  Lab Results   Component Value Date    WBC 9.0 08/24/2023    HGB 15.6 08/24/2023    HCT 47.0 (H) 08/24/2023     (H) 08/24/2023     08/24/2023       CMP -  Lab Results   Component Value Date    CALCIUM 9.3 08/24/2023    PROT 7.6 08/24/2023    ALBUMIN 4.5 08/24/2023    AST 24 08/24/2023    ALT 26 08/24/2023    ALKPHOS 101 08/24/2023    BILITOT 0.5 08/24/2023       LIPID PANEL -   Lab Results   Component Value Date    CHOL 254 (H) 08/24/2023    TRIG 124 08/24/2023    HDL 56.9 08/24/2023    CHHDL 4.5 08/24/2023    LDLF 172 (H) 08/24/2023    VLDL 25 08/24/2023       RENAL FUNCTION PANEL -   Lab Results   Component Value Date    GLUCOSE 89 08/24/2023     08/24/2023    K 4.6 08/24/2023     08/24/2023    CO2 28 08/24/2023    ANIONGAP 16 08/24/2023    BUN 10 08/24/2023    CREATININE 0.70 08/24/2023    CALCIUM 9.3 08/24/2023    ALBUMIN 4.5 08/24/2023        TSH (8/2023): 1.89    Last Cardiology Tests:  ECG:  ECG 12 lead (Clinic Performed) 02/29/2024 (Preliminary)    Sinus rhythm, normal MN interval, narrow QRS, QTc 473 ms, HR 85 bpm.    Echo:  Transthoracic Echo (TTE) Complete 02/22/2024  CONCLUSIONS:   1. Left ventricular systolic function is normal with a 60-65% estimated ejection fraction.   2. There is mild mitral and tricuspid regurgitation.    Nuclear Stress Test 01/19/2024    IMPRESSION:  1.  Normal myocardial perfusion study without evidence of ischemia or  prior infarction.  2. The left ventricle is  normal in size.  3. Normal LV wall motion with an LV EF estimated at greater than 65%.    Cardiac Imaging:  CT cardiac scoring wo IV contrast 10/24/2023    FINDINGS:  The score and distribution of calcium in the coronary arteries is as  follows:      LM 0,  LAD 99.39,  LCx 0,  .4,      Total 205.79    Event monitor (01/2024)  Patient had a min HR of 64 bpm, max HR of 200 bpm, and avg HR of 91 bpm.  Predominant underlying rhythm was Sinus Rhythm. 1 run of Ventricular Tachycardia  occurred lasting 10.6 secs with a max rate of 194 bpm (avg 169 bpm). 21  Supraventricular Tachycardia runs occurred, the run with the fastest interval lasting  10 beats with a max rate of 200 bpm, the longest lasting 17 beats with an avg rate  of 128 bpm. Isolated SVEs were rare (<1.0%), SVE Couplets were rare (<1.0%), and  SVE Triplets were rare (<1.0%). Isolated VEs were rare (<1.0%), VE Couplets were  rare (<1.0%), and no VE Triplets were present.     Diagnostic review: I have personally reviewed the result(s)     Assessment/Plan   Diagnoses and all orders for this visit:  Primary hypertension  -     ECG 12 lead (Clinic Performed)  VT (ventricular tachycardia) (CMS/[facility])  -     MR cardiac MR w and wo IV contrast w treadmill stress for MORPH FUNCT and VALVE DZ; Future  -     metoprolol succinate XL (Toprol-XL) 50 mg 24 hr tablet; Take 1 tablet (50 mg) by mouth once daily. Do not crush or chew.  Palpitations  -     MR cardiac MR w and wo IV contrast w treadmill stress for MORPH FUNCT and VALVE DZ; Future  -     metoprolol succinate XL (Toprol-XL) 50 mg 24 hr tablet; Take 1 tablet (50 mg) by mouth once daily. Do not crush or chew.  Other fatigue  -     MR cardiac MR w and wo IV contrast w treadmill stress for MORPH FUNCT and VALVE DZ; Future      Patient complains of heart pounding and heart racing palpitations that occur almost daily. It can last up to 20-30 minutes. She reports lightheadedness but denies syncope. It started after she  had COVID back in 2021. Zio shows 1 episode of wide complex tachycardia lasting 10 seconds with a HR of 194 bpm and 21 episodes of NSSVT lasting up to 17 beats and max rate of 200 bpm.  Will discontinue her diltiazem and start metoprolol. Will order cardiac MRI. I discussed with the patient about an EP study +/- catheter ablation as a possible next step.    Gume Gomez MD

## 2024-03-12 DIAGNOSIS — E78.5 HYPERLIPIDEMIA, UNSPECIFIED HYPERLIPIDEMIA TYPE: ICD-10-CM

## 2024-03-12 DIAGNOSIS — I10 PRIMARY HYPERTENSION: ICD-10-CM

## 2024-03-13 DIAGNOSIS — I10 PRIMARY HYPERTENSION: ICD-10-CM

## 2024-03-13 DIAGNOSIS — E78.5 HYPERLIPIDEMIA, UNSPECIFIED HYPERLIPIDEMIA TYPE: ICD-10-CM

## 2024-03-13 RX ORDER — ROSUVASTATIN CALCIUM 10 MG/1
10 TABLET, COATED ORAL DAILY
Qty: 90 TABLET | Refills: 0 | Status: SHIPPED | OUTPATIENT
Start: 2024-03-13 | End: 2024-03-13 | Stop reason: SDUPTHER

## 2024-03-14 RX ORDER — LOSARTAN POTASSIUM AND HYDROCHLOROTHIAZIDE 25; 100 MG/1; MG/1
1 TABLET ORAL DAILY
Qty: 90 TABLET | Refills: 0 | Status: SHIPPED | OUTPATIENT
Start: 2024-03-14 | End: 2024-04-05 | Stop reason: SDUPTHER

## 2024-03-14 RX ORDER — ROSUVASTATIN CALCIUM 10 MG/1
10 TABLET, COATED ORAL DAILY
Qty: 90 TABLET | Refills: 0 | Status: SHIPPED | OUTPATIENT
Start: 2024-03-14 | End: 2024-04-05 | Stop reason: SDUPTHER

## 2024-03-15 ENCOUNTER — HOSPITAL ENCOUNTER (OUTPATIENT)
Dept: RADIOLOGY | Facility: CLINIC | Age: 59
Discharge: HOME | End: 2024-03-15
Payer: COMMERCIAL

## 2024-03-15 DIAGNOSIS — R92.8 OTHER ABNORMAL AND INCONCLUSIVE FINDINGS ON DIAGNOSTIC IMAGING OF BREAST: ICD-10-CM

## 2024-03-15 PROCEDURE — 76642 ULTRASOUND BREAST LIMITED: CPT | Mod: RIGHT SIDE | Performed by: STUDENT IN AN ORGANIZED HEALTH CARE EDUCATION/TRAINING PROGRAM

## 2024-03-15 PROCEDURE — 76982 USE 1ST TARGET LESION: CPT | Mod: RT

## 2024-03-15 PROCEDURE — 76642 ULTRASOUND BREAST LIMITED: CPT | Mod: RT

## 2024-03-25 ENCOUNTER — APPOINTMENT (OUTPATIENT)
Dept: RADIOLOGY | Facility: HOSPITAL | Age: 59
End: 2024-03-25
Payer: COMMERCIAL

## 2024-03-25 DIAGNOSIS — R53.83 OTHER FATIGUE: ICD-10-CM

## 2024-03-25 DIAGNOSIS — E55.9 VITAMIN D DEFICIENCY: ICD-10-CM

## 2024-03-25 DIAGNOSIS — I10 PRIMARY HYPERTENSION: ICD-10-CM

## 2024-03-25 DIAGNOSIS — E78.5 HYPERLIPIDEMIA, UNSPECIFIED HYPERLIPIDEMIA TYPE: ICD-10-CM

## 2024-03-25 DIAGNOSIS — Z00.00 HEALTHCARE MAINTENANCE: ICD-10-CM

## 2024-03-26 ENCOUNTER — HOSPITAL ENCOUNTER (OUTPATIENT)
Dept: RADIOLOGY | Facility: HOSPITAL | Age: 59
Discharge: HOME | End: 2024-03-26
Payer: COMMERCIAL

## 2024-03-26 ENCOUNTER — LAB (OUTPATIENT)
Dept: LAB | Facility: LAB | Age: 59
End: 2024-03-26
Payer: COMMERCIAL

## 2024-03-26 VITALS — HEIGHT: 62 IN | WEIGHT: 191.8 LBS | BODY MASS INDEX: 35.3 KG/M2

## 2024-03-26 DIAGNOSIS — R53.83 OTHER FATIGUE: ICD-10-CM

## 2024-03-26 DIAGNOSIS — I10 PRIMARY HYPERTENSION: ICD-10-CM

## 2024-03-26 DIAGNOSIS — R00.2 PALPITATIONS: ICD-10-CM

## 2024-03-26 DIAGNOSIS — I47.20 VT (VENTRICULAR TACHYCARDIA) (MULTI): ICD-10-CM

## 2024-03-26 DIAGNOSIS — E78.5 HYPERLIPIDEMIA, UNSPECIFIED HYPERLIPIDEMIA TYPE: ICD-10-CM

## 2024-03-26 DIAGNOSIS — E55.9 VITAMIN D DEFICIENCY: ICD-10-CM

## 2024-03-26 DIAGNOSIS — Z00.00 HEALTHCARE MAINTENANCE: ICD-10-CM

## 2024-03-26 LAB
25(OH)D3 SERPL-MCNC: 47 NG/ML (ref 30–100)
ALBUMIN SERPL BCP-MCNC: 4.4 G/DL (ref 3.4–5)
ALP SERPL-CCNC: 85 U/L (ref 33–110)
ALT SERPL W P-5'-P-CCNC: 15 U/L (ref 7–45)
ANION GAP SERPL CALC-SCNC: 16 MMOL/L (ref 10–20)
AST SERPL W P-5'-P-CCNC: 14 U/L (ref 9–39)
BASOPHILS # BLD AUTO: 0.04 X10*3/UL (ref 0–0.1)
BASOPHILS NFR BLD AUTO: 0.4 %
BILIRUB SERPL-MCNC: 0.4 MG/DL (ref 0–1.2)
BUN SERPL-MCNC: 16 MG/DL (ref 6–23)
CALCIUM SERPL-MCNC: 9.8 MG/DL (ref 8.6–10.6)
CHLORIDE SERPL-SCNC: 97 MMOL/L (ref 98–107)
CHOLEST SERPL-MCNC: 204 MG/DL (ref 0–199)
CHOLESTEROL/HDL RATIO: 4.4
CO2 SERPL-SCNC: 30 MMOL/L (ref 21–32)
CREAT SERPL-MCNC: 0.69 MG/DL (ref 0.5–1.05)
EGFRCR SERPLBLD CKD-EPI 2021: >90 ML/MIN/1.73M*2
EOSINOPHIL # BLD AUTO: 0.29 X10*3/UL (ref 0–0.7)
EOSINOPHIL NFR BLD AUTO: 3.3 %
ERYTHROCYTE [DISTWIDTH] IN BLOOD BY AUTOMATED COUNT: 12.7 % (ref 11.5–14.5)
GLUCOSE SERPL-MCNC: 81 MG/DL (ref 74–99)
HCT VFR BLD AUTO: 42.2 % (ref 36–46)
HDLC SERPL-MCNC: 46 MG/DL
HGB BLD-MCNC: 14 G/DL (ref 12–16)
IMM GRANULOCYTES # BLD AUTO: 0.01 X10*3/UL (ref 0–0.7)
IMM GRANULOCYTES NFR BLD AUTO: 0.1 % (ref 0–0.9)
LDLC SERPL CALC-MCNC: 117 MG/DL
LYMPHOCYTES # BLD AUTO: 2.62 X10*3/UL (ref 1.2–4.8)
LYMPHOCYTES NFR BLD AUTO: 29.5 %
MCH RBC QN AUTO: 33.2 PG (ref 26–34)
MCHC RBC AUTO-ENTMCNC: 33.2 G/DL (ref 32–36)
MCV RBC AUTO: 100 FL (ref 80–100)
MONOCYTES # BLD AUTO: 0.67 X10*3/UL (ref 0.1–1)
MONOCYTES NFR BLD AUTO: 7.5 %
NEUTROPHILS # BLD AUTO: 5.26 X10*3/UL (ref 1.2–7.7)
NEUTROPHILS NFR BLD AUTO: 59.2 %
NON HDL CHOLESTEROL: 158 MG/DL (ref 0–149)
NRBC BLD-RTO: 0 /100 WBCS (ref 0–0)
PLATELET # BLD AUTO: 353 X10*3/UL (ref 150–450)
POTASSIUM SERPL-SCNC: 3.9 MMOL/L (ref 3.5–5.3)
PROT SERPL-MCNC: 7.4 G/DL (ref 6.4–8.2)
RBC # BLD AUTO: 4.22 X10*6/UL (ref 4–5.2)
SODIUM SERPL-SCNC: 139 MMOL/L (ref 136–145)
TRIGL SERPL-MCNC: 206 MG/DL (ref 0–149)
TSH SERPL-ACNC: 2.05 MIU/L (ref 0.44–3.98)
VLDL: 41 MG/DL (ref 0–40)
WBC # BLD AUTO: 8.9 X10*3/UL (ref 4.4–11.3)

## 2024-03-26 PROCEDURE — 82306 VITAMIN D 25 HYDROXY: CPT

## 2024-03-26 PROCEDURE — 75561 CARDIAC MRI FOR MORPH W/DYE: CPT | Performed by: RADIOLOGY

## 2024-03-26 PROCEDURE — 2550000001 HC RX 255 CONTRASTS: Performed by: STUDENT IN AN ORGANIZED HEALTH CARE EDUCATION/TRAINING PROGRAM

## 2024-03-26 PROCEDURE — 84443 ASSAY THYROID STIM HORMONE: CPT

## 2024-03-26 PROCEDURE — 36415 COLL VENOUS BLD VENIPUNCTURE: CPT

## 2024-03-26 PROCEDURE — 75561 CARDIAC MRI FOR MORPH W/DYE: CPT

## 2024-03-26 PROCEDURE — 80053 COMPREHEN METABOLIC PANEL: CPT

## 2024-03-26 PROCEDURE — 80061 LIPID PANEL: CPT

## 2024-03-26 PROCEDURE — A9575 INJ GADOTERATE MEGLUMI 0.1ML: HCPCS | Performed by: STUDENT IN AN ORGANIZED HEALTH CARE EDUCATION/TRAINING PROGRAM

## 2024-03-26 PROCEDURE — 85025 COMPLETE CBC W/AUTO DIFF WBC: CPT

## 2024-03-26 RX ORDER — GADOTERATE MEGLUMINE 376.9 MG/ML
36 INJECTION INTRAVENOUS
Status: COMPLETED | OUTPATIENT
Start: 2024-03-26 | End: 2024-03-26

## 2024-03-26 RX ADMIN — GADOTERATE MEGLUMINE 36 ML: 376.9 INJECTION INTRAVENOUS at 13:06

## 2024-04-01 DIAGNOSIS — K21.9 GASTROESOPHAGEAL REFLUX DISEASE WITHOUT ESOPHAGITIS: ICD-10-CM

## 2024-04-01 RX ORDER — OMEPRAZOLE 20 MG/1
20 CAPSULE, DELAYED RELEASE ORAL DAILY
Qty: 30 CAPSULE | Refills: 3 | Status: SHIPPED | OUTPATIENT
Start: 2024-04-01 | End: 2024-04-05 | Stop reason: SDUPTHER

## 2024-04-04 ENCOUNTER — OFFICE VISIT (OUTPATIENT)
Dept: CARDIOLOGY | Facility: HOSPITAL | Age: 59
End: 2024-04-04
Payer: COMMERCIAL

## 2024-04-04 VITALS
OXYGEN SATURATION: 98 % | BODY MASS INDEX: 31.64 KG/M2 | DIASTOLIC BLOOD PRESSURE: 84 MMHG | SYSTOLIC BLOOD PRESSURE: 131 MMHG | WEIGHT: 171.96 LBS | HEART RATE: 83 BPM

## 2024-04-04 DIAGNOSIS — R00.2 PALPITATION: ICD-10-CM

## 2024-04-04 DIAGNOSIS — I47.20 VT (VENTRICULAR TACHYCARDIA) (MULTI): Primary | ICD-10-CM

## 2024-04-04 LAB
ATRIAL RATE: 79 BPM
P AXIS: 61 DEGREES
P OFFSET: 202 MS
P ONSET: 151 MS
PR INTERVAL: 140 MS
Q ONSET: 221 MS
QRS COUNT: 13 BEATS
QRS DURATION: 62 MS
QT INTERVAL: 406 MS
QTC CALCULATION(BAZETT): 465 MS
QTC FREDERICIA: 445 MS
R AXIS: 48 DEGREES
T AXIS: 24 DEGREES
T OFFSET: 424 MS
VENTRICULAR RATE: 79 BPM

## 2024-04-04 PROCEDURE — 99214 OFFICE O/P EST MOD 30 MIN: CPT | Performed by: STUDENT IN AN ORGANIZED HEALTH CARE EDUCATION/TRAINING PROGRAM

## 2024-04-04 PROCEDURE — 93005 ELECTROCARDIOGRAM TRACING: CPT | Performed by: STUDENT IN AN ORGANIZED HEALTH CARE EDUCATION/TRAINING PROGRAM

## 2024-04-04 PROCEDURE — 3079F DIAST BP 80-89 MM HG: CPT | Performed by: STUDENT IN AN ORGANIZED HEALTH CARE EDUCATION/TRAINING PROGRAM

## 2024-04-04 PROCEDURE — 3075F SYST BP GE 130 - 139MM HG: CPT | Performed by: STUDENT IN AN ORGANIZED HEALTH CARE EDUCATION/TRAINING PROGRAM

## 2024-04-04 ASSESSMENT — ENCOUNTER SYMPTOMS
SHORTNESS OF BREATH: 0
DIZZINESS: 0
FEVER: 0
CONFUSION: 0
PALPITATIONS: 0

## 2024-04-04 NOTE — PROGRESS NOTES
Chief Complaint:   No chief complaint on file.     History Of Present Illness:    Marisela Zaragoza is a 58 y.o. female with PMH of HTN, HLD, elevated CCS (205.79 10/24/2023), referred to EP due to palpitations and abnormal Zio.  Patient initially complained of heart pounding and heart racing palpitations that occur almost daily. It lasted up to 20-30 minutes. She reports lightheadedness but denies syncope. It started after she had COVID back in 2021. She also reported anxiety. Zio shows 1 episode of wide complex tachycardia lasting 10 seconds with a HR of 194 bpm and 21 episodes of NSSVT lasting up to 17 beats and max rate of 200 bpm. Her diltiazem was discontinued and metoprolol was initiated.   Patient had an MRI with no abnormalities. She comes in today reporting to feel better, no palpitations since about 2 weeks after she started taking metoprolol.     Last Recorded Vitals:  Vitals:    04/04/24 1503   BP: 131/84   Pulse: 83   SpO2: 98%   Weight: 78 kg (171 lb 15.3 oz)       Past Medical History:  She has no past medical history on file.    Past Surgical History:  She has no past surgical history on file.      Social History:  She reports that she has been smoking cigarettes. She has a 11.25 pack-year smoking history. She has never used smokeless tobacco. She reports current alcohol use. She reports current drug use. Drug: Marijuana.    Family History:  No family history on file.     Allergies:  Penicillin, Sulfa (sulfonamide antibiotics), Tetanus toxoid, and Codeine    Outpatient Medications:  Current Outpatient Medications   Medication Instructions    aspirin 81 mg, oral, Daily    cholecalciferol (VITAMIN D-3) 50 mcg, oral, Daily    losartan-hydrochlorothiazide (Hyzaar) 100-25 mg tablet 1 tablet, oral, Daily    metoprolol succinate XL (TOPROL-XL) 50 mg, oral, Daily, Do not crush or chew.    omeprazole (PRILOSEC) 20 mg, oral, Daily, Swallow whole. Do not crush or chew.    rosuvastatin (CRESTOR) 10 mg, oral, Daily  "      Review of Systems   Constitutional:  Negative for fever.   Respiratory:  Negative for shortness of breath.    Cardiovascular:  Negative for chest pain, palpitations and leg swelling.        As per history.   Neurological:  Negative for dizziness and syncope.   Psychiatric/Behavioral:  Negative for confusion.       Physical Exam  Constitutional:       Appearance: Normal appearance.   Cardiovascular:      Rate and Rhythm: Normal rate and regular rhythm.      Heart sounds: No murmur heard.     No friction rub. No gallop.   Pulmonary:      Effort: Pulmonary effort is normal.      Breath sounds: Normal breath sounds.   Abdominal:      Palpations: Abdomen is soft.   Musculoskeletal:      Cervical back: Neck supple.   Neurological:      Mental Status: She is alert.   Psychiatric:         Mood and Affect: Mood normal.         Behavior: Behavior normal.           Last Labs:  CBC -  Lab Results   Component Value Date    WBC 8.9 03/26/2024    HGB 14.0 03/26/2024    HCT 42.2 03/26/2024     03/26/2024     03/26/2024       CMP -  Lab Results   Component Value Date    CALCIUM 9.8 03/26/2024    PROT 7.4 03/26/2024    ALBUMIN 4.4 03/26/2024    AST 14 03/26/2024    ALT 15 03/26/2024    ALKPHOS 85 03/26/2024    BILITOT 0.4 03/26/2024       LIPID PANEL -   Lab Results   Component Value Date    CHOL 204 (H) 03/26/2024    TRIG 206 (H) 03/26/2024    HDL 46.0 03/26/2024    CHHDL 4.4 03/26/2024    LDLF 172 (H) 08/24/2023    VLDL 41 (H) 03/26/2024    NHDL 158 (H) 03/26/2024       RENAL FUNCTION PANEL -   Lab Results   Component Value Date    GLUCOSE 81 03/26/2024     03/26/2024    K 3.9 03/26/2024    CL 97 (L) 03/26/2024    CO2 30 03/26/2024    ANIONGAP 16 03/26/2024    BUN 16 03/26/2024    CREATININE 0.69 03/26/2024    CALCIUM 9.8 03/26/2024    ALBUMIN 4.4 03/26/2024        No results found for: \"BNP\", \"HGBA1C\"    Last Cardiology Tests:  ECG:  ECG 12 lead (Clinic Performed) 02/29/2024      Sinus rhythm, normal NC " interval, narrow QRS, QTc 473 ms, HR 85 bpm.     ECG 12 lead (Clinic Performed) 4/4/2024      Sinus rhythm, normal SD interval, narrow QRS, QTc 465 ms, HR 79 bpm.     Echo:  Transthoracic Echo (TTE) Complete 02/22/2024  CONCLUSIONS:   1. Left ventricular systolic function is normal with a 60-65% estimated ejection fraction.   2. There is mild mitral and tricuspid regurgitation.     Nuclear Stress Test 01/19/2024     IMPRESSION:  1.  Normal myocardial perfusion study without evidence of ischemia or  prior infarction.  2. The left ventricle is normal in size.  3. Normal LV wall motion with an LV EF estimated at greater than 65%.     Cardiac Imaging:  CT cardiac scoring wo IV contrast 10/24/2023     FINDINGS:  The score and distribution of calcium in the coronary arteries is as  follows:      LM 0,  LAD 99.39,  LCx 0,  .4,      Total 205.79     Event monitor (01/2024)  Patient had a min HR of 64 bpm, max HR of 200 bpm, and avg HR of 91 bpm.  Predominant underlying rhythm was Sinus Rhythm. 1 run of Ventricular Tachycardia  occurred lasting 10.6 secs with a max rate of 194 bpm (avg 169 bpm). 21  Supraventricular Tachycardia runs occurred, the run with the fastest interval lasting  10 beats with a max rate of 200 bpm, the longest lasting 17 beats with an avg rate  of 128 bpm. Isolated SVEs were rare (<1.0%), SVE Couplets were rare (<1.0%), and  SVE Triplets were rare (<1.0%). Isolated VEs were rare (<1.0%), VE Couplets were  rare (<1.0%), and no VE Triplets were present.      Cardiac Imaging:  MR cardiac morphology and function w and wo IV contrast 03/26/2024    IMPRESSION:  1. The left ventricle is normal in size, shape, and has hyperdynamic  global systolic function. LVEF = 76%. There are no segmental wall  motion abnormalities.  Quantitative values are as noted above.  2. There are no findings to suggest prior ischemic damage or an  infiltrative process.  3. Normal aortic, mitral, and tricuspid valve  function.    Diagnostic review: I have personally reviewed the result(s)     Assessment/Plan   Diagnoses and all orders for this visit:  VT (ventricular tachycardia) (CMS/[facility])  Palpitation  -     ECG 12 lead (Clinic Performed)    Patient initially complained of heart pounding and heart racing palpitations that occur almost daily. It lasted up to 20-30 minutes. She reports lightheadedness but denies syncope. It started after she had COVID back in 2021. She also reported anxiety. Zio shows 1 episode of wide complex tachycardia lasting 10 seconds with a HR of 194 bpm and 21 episodes of NSSVT lasting up to 17 beats and max rate of 200 bpm. Her diltiazem was discontinued and metoprolol was initiated.   Patient had an MRI with no abnormalities. She comes in today reporting to feel better, no palpitations since about 2 weeks after she started taking metoprolol.  Will keep her on metoprolol. Will schedule a follow up.    Gume Gomez MD

## 2024-04-05 ENCOUNTER — OFFICE VISIT (OUTPATIENT)
Dept: PRIMARY CARE | Facility: CLINIC | Age: 59
End: 2024-04-05
Payer: COMMERCIAL

## 2024-04-05 VITALS
OXYGEN SATURATION: 98 % | BODY MASS INDEX: 32.28 KG/M2 | SYSTOLIC BLOOD PRESSURE: 123 MMHG | WEIGHT: 171 LBS | HEART RATE: 81 BPM | HEIGHT: 61 IN | DIASTOLIC BLOOD PRESSURE: 76 MMHG

## 2024-04-05 DIAGNOSIS — I47.20 VT (VENTRICULAR TACHYCARDIA) (MULTI): ICD-10-CM

## 2024-04-05 DIAGNOSIS — K21.9 GASTROESOPHAGEAL REFLUX DISEASE WITHOUT ESOPHAGITIS: ICD-10-CM

## 2024-04-05 DIAGNOSIS — I10 PRIMARY HYPERTENSION: ICD-10-CM

## 2024-04-05 DIAGNOSIS — E78.5 HYPERLIPIDEMIA, UNSPECIFIED HYPERLIPIDEMIA TYPE: ICD-10-CM

## 2024-04-05 DIAGNOSIS — J45.909 UNCOMPLICATED ASTHMA, UNSPECIFIED ASTHMA SEVERITY, UNSPECIFIED WHETHER PERSISTENT (HHS-HCC): Primary | ICD-10-CM

## 2024-04-05 DIAGNOSIS — E55.9 VITAMIN D DEFICIENCY: ICD-10-CM

## 2024-04-05 DIAGNOSIS — R00.2 PALPITATIONS: ICD-10-CM

## 2024-04-05 PROCEDURE — 3078F DIAST BP <80 MM HG: CPT | Performed by: NURSE PRACTITIONER

## 2024-04-05 PROCEDURE — 99214 OFFICE O/P EST MOD 30 MIN: CPT | Performed by: NURSE PRACTITIONER

## 2024-04-05 PROCEDURE — 3074F SYST BP LT 130 MM HG: CPT | Performed by: NURSE PRACTITIONER

## 2024-04-05 RX ORDER — ALBUTEROL SULFATE 90 UG/1
2 AEROSOL, METERED RESPIRATORY (INHALATION) EVERY 4 HOURS PRN
Qty: 8 G | Refills: 1 | Status: SHIPPED | OUTPATIENT
Start: 2024-04-05 | End: 2024-05-05

## 2024-04-05 RX ORDER — METOPROLOL SUCCINATE 50 MG/1
50 TABLET, EXTENDED RELEASE ORAL DAILY
Qty: 90 TABLET | Refills: 3 | Status: SHIPPED | OUTPATIENT
Start: 2024-04-05

## 2024-04-05 RX ORDER — OMEPRAZOLE 20 MG/1
20 CAPSULE, DELAYED RELEASE ORAL DAILY
Qty: 90 CAPSULE | Refills: 3 | Status: SHIPPED | OUTPATIENT
Start: 2024-04-05

## 2024-04-05 RX ORDER — ROSUVASTATIN CALCIUM 10 MG/1
10 TABLET, COATED ORAL DAILY
Qty: 90 TABLET | Refills: 3 | Status: SHIPPED | OUTPATIENT
Start: 2024-04-05

## 2024-04-05 RX ORDER — LOSARTAN POTASSIUM AND HYDROCHLOROTHIAZIDE 25; 100 MG/1; MG/1
1 TABLET ORAL DAILY
Qty: 90 TABLET | Refills: 3 | Status: SHIPPED | OUTPATIENT
Start: 2024-04-05 | End: 2025-04-05

## 2024-04-05 ASSESSMENT — ENCOUNTER SYMPTOMS
MUSCULOSKELETAL NEGATIVE: 1
CONSTIPATION: 0
DIZZINESS: 0
SORE THROAT: 0
NUMBNESS: 0
SHORTNESS OF BREATH: 0
WEAKNESS: 0
VOMITING: 0
CHILLS: 0
COUGH: 0
FEVER: 0
NAUSEA: 0
DIARRHEA: 0
ABDOMINAL PAIN: 0
PSYCHIATRIC NEGATIVE: 1
HEADACHES: 0
FATIGUE: 0
PALPITATIONS: 0

## 2024-04-05 ASSESSMENT — PATIENT HEALTH QUESTIONNAIRE - PHQ9
2. FEELING DOWN, DEPRESSED OR HOPELESS: NOT AT ALL
1. LITTLE INTEREST OR PLEASURE IN DOING THINGS: NOT AT ALL
SUM OF ALL RESPONSES TO PHQ9 QUESTIONS 1 AND 2: 0

## 2024-04-05 NOTE — PROGRESS NOTES
"Subjective   Patient ID: Marisela Zaragoza is a 58 y.o. female who presents for follow up and medication refills.    HPI   Overall doing very well.  Just quit her job and stress decreased significantly.  Denies chest pain, SOB, palpitations, dizziness,  or GI issues.  Taking medications as prescribed   Stopped Prozac, feeling better off.      Review of Systems   Constitutional:  Negative for chills, fatigue and fever.   HENT:  Negative for congestion, ear pain and sore throat.    Eyes:  Negative for visual disturbance.   Respiratory:  Negative for cough and shortness of breath.    Cardiovascular:  Negative for chest pain, palpitations and leg swelling.   Gastrointestinal:  Negative for abdominal pain, constipation, diarrhea, nausea and vomiting.   Genitourinary: Negative.    Musculoskeletal: Negative.    Skin:  Negative for rash.   Neurological:  Negative for dizziness, weakness, numbness and headaches.   Psychiatric/Behavioral: Negative.         Objective   /76   Pulse 81   Ht 1.549 m (5' 1\")   Wt 77.6 kg (171 lb)   LMP  (LMP Unknown)   SpO2 98%   BMI 32.31 kg/m²     Physical Exam  Constitutional:       General: She is not in acute distress.     Appearance: Normal appearance.   HENT:      Head: Normocephalic and atraumatic.      Right Ear: Tympanic membrane, ear canal and external ear normal.      Left Ear: Tympanic membrane, ear canal and external ear normal.      Nose: Nose normal.      Mouth/Throat:      Mouth: Mucous membranes are moist.      Pharynx: Oropharynx is clear.   Eyes:      Extraocular Movements: Extraocular movements intact.      Conjunctiva/sclera: Conjunctivae normal.      Pupils: Pupils are equal, round, and reactive to light.   Cardiovascular:      Rate and Rhythm: Normal rate and regular rhythm.      Pulses: Normal pulses.      Heart sounds: Normal heart sounds. No murmur heard.  Pulmonary:      Effort: Pulmonary effort is normal.      Breath sounds: Normal breath sounds. No wheezing, " rhonchi or rales.   Abdominal:      General: Bowel sounds are normal.      Palpations: Abdomen is soft.      Tenderness: There is no abdominal tenderness.   Musculoskeletal:         General: Normal range of motion.      Cervical back: Normal range of motion and neck supple.   Lymphadenopathy:      Comments: No lymphadenopathy noted   Skin:     General: Skin is warm and dry.      Findings: No rash.   Neurological:      General: No focal deficit present.      Mental Status: She is alert and oriented to person, place, and time.      Cranial Nerves: No cranial nerve deficit.      Coordination: Coordination normal.      Gait: Gait normal.   Psychiatric:         Mood and Affect: Mood normal.         Behavior: Behavior normal.         Assessment/Plan   Problem List Items Addressed This Visit             ICD-10-CM    Primary hypertension I10    Relevant Medications    losartan-hydrochlorothiazide (Hyzaar) 100-25 mg tablet    rosuvastatin (Crestor) 10 mg tablet    Hyperlipidemia E78.5    Relevant Medications    rosuvastatin (Crestor) 10 mg tablet    Gastroesophageal reflux disease without esophagitis K21.9    Relevant Medications    omeprazole (PriLOSEC) 20 mg DR capsule     Other Visit Diagnoses         Codes    Uncomplicated asthma, unspecified asthma severity, unspecified whether persistent    -  Primary J45.909    Relevant Medications    albuterol (Ventolin HFA) 90 mcg/actuation inhaler    VT (ventricular tachycardia) (CMS/[facility])     I47.20    Relevant Medications    metoprolol succinate XL (Toprol-XL) 50 mg 24 hr tablet    Palpitations     R00.2    Relevant Medications    metoprolol succinate XL (Toprol-XL) 50 mg 24 hr tablet        Reviewed all labs.    Follow up in 6 months or sooner if needed

## 2024-09-19 ENCOUNTER — APPOINTMENT (OUTPATIENT)
Dept: RADIOLOGY | Facility: CLINIC | Age: 59
End: 2024-09-19
Payer: COMMERCIAL

## 2025-02-14 ENCOUNTER — APPOINTMENT (OUTPATIENT)
Dept: RADIOLOGY | Facility: HOSPITAL | Age: 60
End: 2025-02-14
Payer: COMMERCIAL

## 2025-02-14 ENCOUNTER — HOSPITAL ENCOUNTER (EMERGENCY)
Facility: HOSPITAL | Age: 60
Discharge: HOME | End: 2025-02-14
Attending: STUDENT IN AN ORGANIZED HEALTH CARE EDUCATION/TRAINING PROGRAM
Payer: COMMERCIAL

## 2025-02-14 VITALS
OXYGEN SATURATION: 100 % | TEMPERATURE: 97.9 F | WEIGHT: 165 LBS | DIASTOLIC BLOOD PRESSURE: 84 MMHG | HEART RATE: 75 BPM | RESPIRATION RATE: 16 BRPM | SYSTOLIC BLOOD PRESSURE: 166 MMHG | HEIGHT: 62 IN | BODY MASS INDEX: 30.36 KG/M2
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
## 2025-02-14 DIAGNOSIS — S06.0X0A CONCUSSION WITHOUT LOSS OF CONSCIOUSNESS, INITIAL ENCOUNTER: ICD-10-CM

## 2025-02-14 PROCEDURE — 72125 CT NECK SPINE W/O DYE: CPT

## 2025-02-14 PROCEDURE — 82075 ASSAY OF BREATH ETHANOL: CPT

## 2025-02-14 PROCEDURE — 72125 CT NECK SPINE W/O DYE: CPT | Performed by: RADIOLOGY

## 2025-02-14 PROCEDURE — 99284 EMERGENCY DEPT VISIT MOD MDM: CPT | Mod: 25 | Performed by: STUDENT IN AN ORGANIZED HEALTH CARE EDUCATION/TRAINING PROGRAM

## 2025-02-14 PROCEDURE — 96372 THER/PROPH/DIAG INJ SC/IM: CPT

## 2025-02-14 PROCEDURE — 70450 CT HEAD/BRAIN W/O DYE: CPT | Performed by: RADIOLOGY

## 2025-02-14 PROCEDURE — 2500000004 HC RX 250 GENERAL PHARMACY W/ HCPCS (ALT 636 FOR OP/ED)

## 2025-02-14 PROCEDURE — 2500000004 HC RX 250 GENERAL PHARMACY W/ HCPCS (ALT 636 FOR OP/ED): Performed by: STUDENT IN AN ORGANIZED HEALTH CARE EDUCATION/TRAINING PROGRAM

## 2025-02-14 PROCEDURE — 70450 CT HEAD/BRAIN W/O DYE: CPT

## 2025-02-14 RX ORDER — ONDANSETRON 4 MG/1
4 TABLET, ORALLY DISINTEGRATING ORAL EVERY 8 HOURS PRN
Qty: 15 TABLET | Refills: 0 | Status: SHIPPED | OUTPATIENT
Start: 2025-02-14 | End: 2025-04-16 | Stop reason: ALTCHOICE

## 2025-02-14 RX ORDER — KETOROLAC TROMETHAMINE 30 MG/ML
INJECTION, SOLUTION INTRAMUSCULAR; INTRAVENOUS
Status: COMPLETED
Start: 2025-02-14 | End: 2025-02-14

## 2025-02-14 RX ORDER — IBUPROFEN 200 MG
1 TABLET ORAL DAILY
Status: DISCONTINUED | OUTPATIENT
Start: 2025-02-14 | End: 2025-02-14

## 2025-02-14 RX ORDER — METHOCARBAMOL 500 MG/1
500 TABLET, FILM COATED ORAL 3 TIMES DAILY
Qty: 21 TABLET | Refills: 0 | Status: SHIPPED | OUTPATIENT
Start: 2025-02-14 | End: 2025-04-16 | Stop reason: WASHOUT

## 2025-02-14 RX ORDER — KETOROLAC TROMETHAMINE 30 MG/ML
30 INJECTION, SOLUTION INTRAMUSCULAR; INTRAVENOUS ONCE
Status: COMPLETED | OUTPATIENT
Start: 2025-02-14 | End: 2025-02-14

## 2025-02-14 RX ORDER — IBUPROFEN 200 MG
600 TABLET ORAL EVERY 6 HOURS PRN
Qty: 84 TABLET | Refills: 0 | Status: SHIPPED | OUTPATIENT
Start: 2025-02-14 | End: 2025-02-21

## 2025-02-14 RX ORDER — ONDANSETRON 4 MG/1
4 TABLET, ORALLY DISINTEGRATING ORAL ONCE
Status: COMPLETED | OUTPATIENT
Start: 2025-02-14 | End: 2025-02-14

## 2025-02-14 RX ORDER — ACETAMINOPHEN 325 MG/1
975 TABLET ORAL EVERY 6 HOURS PRN
Qty: 84 TABLET | Refills: 0 | Status: SHIPPED | OUTPATIENT
Start: 2025-02-14 | End: 2025-02-21

## 2025-02-14 RX ADMIN — KETOROLAC TROMETHAMINE 30 MG: 30 INJECTION, SOLUTION INTRAMUSCULAR; INTRAVENOUS at 10:22

## 2025-02-14 RX ADMIN — ONDANSETRON 4 MG: 4 TABLET, ORALLY DISINTEGRATING ORAL at 10:18

## 2025-02-14 ASSESSMENT — PAIN DESCRIPTION - PAIN TYPE
TYPE: ACUTE PAIN
TYPE: ACUTE PAIN

## 2025-02-14 ASSESSMENT — PAIN - FUNCTIONAL ASSESSMENT: PAIN_FUNCTIONAL_ASSESSMENT: 0-10

## 2025-02-14 ASSESSMENT — PAIN DESCRIPTION - DESCRIPTORS
DESCRIPTORS: HEADACHE
DESCRIPTORS: ACHING

## 2025-02-14 ASSESSMENT — PAIN SCALES - GENERAL
PAINLEVEL_OUTOF10: 5 - MODERATE PAIN
PAINLEVEL_OUTOF10: 8

## 2025-02-14 ASSESSMENT — COLUMBIA-SUICIDE SEVERITY RATING SCALE - C-SSRS
2. HAVE YOU ACTUALLY HAD ANY THOUGHTS OF KILLING YOURSELF?: NO
6. HAVE YOU EVER DONE ANYTHING, STARTED TO DO ANYTHING, OR PREPARED TO DO ANYTHING TO END YOUR LIFE?: NO
1. IN THE PAST MONTH, HAVE YOU WISHED YOU WERE DEAD OR WISHED YOU COULD GO TO SLEEP AND NOT WAKE UP?: NO

## 2025-02-14 ASSESSMENT — PAIN DESCRIPTION - LOCATION
LOCATION: HEAD
LOCATION: HEAD

## 2025-02-14 NOTE — ED TRIAGE NOTES
Pt arrived to ED through triage for complaints of a headache after a fall at work. Pt was walking into work when she slipped on the ice falling back hitting her head. Pt states she felt nauseous afterwards but denies vomiting. Pt denies LOC or blood thinners. Pt is filing [employer] and works at "[employer]".

## 2025-02-14 NOTE — Clinical Note
Anya Zaragoza was seen and treated in our emergency department on 2/14/2025.  She may return to work on 02/17/2025.       If you have any questions or concerns, please don't hesitate to call.      Patricia Austin, DO

## 2025-02-14 NOTE — DISCHARGE INSTRUCTIONS
Return to the emergency department if you have significant worsening of symptoms or for any other acute concerns.

## 2025-02-14 NOTE — ED PROVIDER NOTES
CC: Fall and Head Injury (Pt was walking into work when she slipped on the ice falling back hitting her head. Pt states she felt nauseous afterwards but denies vomiting. Pt denies LOC or blood thinners. Pt is filing workers comp and works at [employer]. )     HPI:  Patient is a 59-year-old female not on anticoagulation who presents to the emergency department after a fall on ice.  She slipped backwards and hit her head.  She saw stars but denies loss of consciousness.  She is endorsing neck pain.  She denies back pain.  She denies chest pain or pain in her extremities.    Records Reviewed:  Recent available ED and inpatient notes reviewed in EMR.    PMHx/PSHx:  Per HPI.   - has no past medical history on file.  - has no past surgical history on file.  - has Primary hypertension; Hyperlipidemia; Other fatigue; Vitamin D deficiency; Reactive depression; Gastroesophageal reflux disease without esophagitis; Uncomplicated asthma (HHS-HCC); Palpitations; and VT (ventricular tachycardia) (Multi) on their problem list.    Medications:  Reviewed in EMR. See EMR for complete list of medications and doses.    Allergies:  Penicillin, Sulfa (sulfonamide antibiotics), Tetanus toxoid, and Codeine    Social History:  - Tobacco:  reports that she has been smoking cigarettes. She has a 11.3 pack-year smoking history. She has never used smokeless tobacco.   - Alcohol:  reports current alcohol use.   - Illicit Drugs:  reports current drug use. Drug: Marijuana.     ROS:  Per HPI.       ???????????????????????????????????????????????????????????????  Triage Vitals:  T 36.8 °C (98.2 °F)  HR 71  BP (!) 208/99  RR 16  O2 100 %      Physical Exam  ???????????????????????????????????????????????????????????????  GEN: Uncomfortable appearing, no acute distress  HEAD: Hematoma to right posterior occiput  EYES: PERRL, EOMI  ENT: Midface stable.  NECK: + C-spine tenderness  CVS/CHEST: reg rate, nl rhythm. No chest wall pain   PULM: CTA b/l no  wheezes, crackles, or rhonchi   GI: soft, NT/ND, no rebound or guarding   BACK: no vertebral point tenderness  EXT: No tenderness to palpation., 2+ periph pulses in bilat radial and DP   NEURO: Awake and alert, Strength and sensation is equal in b/l upper and lower extremities, normal ambulation  SKIN: warm, dry  PSYCH: AAOx3 answers questions appropriately    Assessment and Plan:  Patient is a 59-year-old female presenting the emergency department with a fall.  She has a hematoma to her posterior occiput and also has C-spine tenderness.  Therefore imaging obtained.  It was a mechanical fall when she slipped on ice.  Discussed concussion signs and symptoms and management.  If imaging is negative will be discharged with outpatient follow-up and strict return precautions.  Patient is nauseous.  Will give ODT Zofran.    CT imaging negative.  Discussed concussion precautions.  Written for multimodal pain regimen including acetaminophen ibuprofen, Robaxin and Zofran.  Discharged with strict return precautions.    ED Course:  Diagnoses as of 02/14/25 1015   Fall, initial encounter   Concussion without loss of consciousness, initial encounter       Disposition:  Discharged in stable condition with return precautions    Patricia Austin, DO      Procedures ? Smart[employer] last updated 2/14/2025 9:10 AM        Patricia Austin, DO  02/14/25 1016

## 2025-03-29 NOTE — PROGRESS NOTES
"Subjective   Reason for Visit: Anya Zaragoza is an 59 y.o. female here for annual visit and medication refills.         HPI  Anya \"Marisela\" is here for annual wellness exam and medication refills. Last seen by primary care 4/5/24 by JASMYN Keller CNP. History of HTN, Hyperlipidemia, GERD, VT/Palpitations, asthma, depression, anxiety. Smokes cigarettes daily, 11 pack years. Presented to ED 2/14/25 after falling backward on ice and having head injury. Negative CT. Two weeks after head injury followed up with workman comp provider-states had hematoma to back of head lanced and put on doxycycline. Site was cultured, Culture came back with kelly, extended doxycycline course to six weeks. Completed course few days ago. Site residual tender to touch but not enlarged. No systemic complaints of chills, malaise, fever. Yesterday onset of vaginal irritation, history of antibiotic induced yeast infections in past and feels same. Endorses very stressed lately. Recent situational life stressors -dog having seizures, financial difficulty, filed taxes wrong yesterday and worried about money, job and insurance change. Has history of depression/anxiety, used to take xanax when younger. Overtime - life situations changed, did not need xanax anymore, used to take fluoxetine for depression- did not like taking daily medication and felt better off medication,  has not needed or taken fluoxetine for some time. Found relief in medical marijuana, has medical card for use, can no longer afford.            Current Providers  Specialists: I have reviewed specialist-related care of the patient in the medical record.     Review of current and chronic medical conditions:    #Hypertension    Last office BP reading -131/84 4/4/24  Today BP reading 160/82  Current RX: Losartan-Hydrochlorothiazide 100-25 mg daily  Metroprolol succinate 50 mg XL daily    egfr: >90 3/26/24  ASCVD: rosuvastatin    #Palpitations/VT/SVT    Current RX: metoprolol " succinate 50 mg XL daily    Follows with [facility] cardiology    #CAD    Current RX: metoprolol succinate 50 mg XL daily                      Rosuvastatin 10 mg daily  Calcium CT score  >200  ECHO 2/22/24-normal LVSF, EF 60-65%  Nuclear Stress test -normal 1/19/24      #Hyperlipidemia    Current RX: rosuvastatin 10 mg daily       #ANA PAULA    ANA PAULA 7 19  Current RX: none  Historical RX: xanax, fluoxetine for depression     Does not want daily medication at present      #CPE           Colon Cancer Screening: needs to schedule colonoscopy, had insurancechange    Mammogram:   Right breast mass, US 3/15/24-right breast 2 small mass,likely benign, repeat US in 6 months       GYN: menopause 5 years without menses, no vaginal bleeding or pelvic pain              Health Maintenance Due   Topic Date Due    Yearly Adult Physical  Never done    HIV Screening  Never done    Colorectal Cancer Screening  Never done    MMR Vaccines (1 of 1 - Standard series) Never done    Hepatitis C Screening  Never done    Hepatitis B Vaccines (1 of 3 - 19+ 3-dose series) Never done    Pneumococcal Vaccine (1 of 2 - PCV) Never done    Cervical Cancer Screening  Never done    DTaP/Tdap/Td Vaccines (1 - Tdap) Never done    Zoster Vaccines (1 of 2) Never done    Mammogram  09/01/2024    COVID-19 Vaccine (1 - 2024-25 season) Never done       Allergies   Allergen Reactions    Penicillin Anaphylaxis    Sulfa (Sulfonamide Antibiotics) Anaphylaxis    Tetanus Toxoid Anaphylaxis    Codeine Hives               No visits with results within 60 Day(s) from this visit.   Latest known visit with results is:   Office Visit on 04/04/2024   Component Date Value Ref Range Status    Ventricular Rate 04/04/2024 79  BPM Final    Atrial Rate 04/04/2024 79  BPM Final    NM Interval 04/04/2024 140  ms Final    QRS Duration 04/04/2024 62  ms Final    QT Interval 04/04/2024 406  ms Final    QTC Calculation(Bazett) 04/04/2024 465  ms Final    P Axis 04/04/2024 61  degrees Final  "   R Axis 04/04/2024 48  degrees Final    T [place] 04/04/2024 24  degrees Final    QRS Count 04/04/2024 13  beats Final    Q Onset 04/04/2024 221  ms Final    P Onset 04/04/2024 151  ms Final    P Offset 04/04/2024 202  ms Final    T Offset 04/04/2024 424  ms Final    QTC Fredericia 04/04/2024 445  ms Final         Patient Care Team:  SHALINI Mulligan-CNP as PCP - General (Internal Medicine)     Review of Systems   Constitutional:  Negative for appetite change, chills and fever.   HENT: Negative.     Respiratory:  Negative for cough, shortness of breath and wheezing.    Genitourinary:  Negative for difficulty urinating, dysuria, genital sores, menstrual problem, pelvic pain, urgency, vaginal bleeding and vaginal discharge.   Musculoskeletal:  Positive for arthralgias (hands with stiffness, right worse than left, worse with use, no redness, swelling).   Skin: Negative.  Negative for rash and wound.   Neurological: Negative.  Negative for dizziness, tremors, weakness, numbness and headaches.   Psychiatric/Behavioral:  Negative for self-injury. The patient is nervous/anxious.        Objective   Vitals:  /85   Pulse 76   Temp 36.9 °C (98.4 °F)   Ht 1.575 m (5' 2\")   Wt 75.3 kg (166 lb)   LMP  (LMP Unknown)   SpO2 96%   BMI 30.36 kg/m²       History reviewed. No pertinent surgical history.    Current Outpatient Medications   Medication Instructions    albuterol (Ventolin HFA) 90 mcg/actuation inhaler 2 puffs, inhalation, Every 4 hours PRN    cholecalciferol (VITAMIN D-3) 50 mcg, oral, Daily    fluconazole (DIFLUCAN) 150 mg, oral, Once    hydrOXYzine HCL (Atarax) 25 mg tablet Hydroxyzine 25 mg at night if needed for anxiety or insomnia    losartan-hydrochlorothiazide (Hyzaar) 100-25 mg tablet 1 tablet, oral, Daily    metoprolol succinate XL (TOPROL-XL) 50 mg, oral, Daily    rosuvastatin (CRESTOR) 10 mg, oral, Daily       Physical Exam  Constitutional:       General: She is not in acute distress.     " Appearance: She is not ill-appearing, toxic-appearing or diaphoretic.   HENT:      Head:        Mouth/Throat:      Mouth: Mucous membranes are moist.      Pharynx: No posterior oropharyngeal erythema.   Eyes:      Conjunctiva/sclera: Conjunctivae normal.   Cardiovascular:      Rate and Rhythm: Normal rate and regular rhythm.      Pulses:           Posterior tibial pulses are 2+ on the right side and 2+ on the left side.   Pulmonary:      Breath sounds: Normal breath sounds. No wheezing, rhonchi or rales.   Chest:      Chest wall: No tenderness.   Abdominal:      General: Bowel sounds are normal. There is no distension.      Palpations: Abdomen is soft.      Tenderness: There is no abdominal tenderness. There is no guarding.   Musculoskeletal:         General: No swelling.      Right lower leg: No edema.      Left lower leg: No edema.   Lymphadenopathy:      Cervical: No cervical adenopathy.   Skin:     General: Skin is warm.      Capillary Refill: Capillary refill takes less than 2 seconds.      Findings: No erythema or rash.   Neurological:      Mental Status: She is oriented to person, place, and time.   Psychiatric:         Mood and Affect: Mood is anxious. Affect is tearful.         Behavior: Behavior is not aggressive.         Thought Content: Thought content does not include homicidal or suicidal ideation.         Cognition and Memory: Cognition is not impaired. Memory is not impaired.      Comments: Tearful at times during encounter discussing current life financial challenges, dog seizures  PHQ 0  ANA PAULA 7 19         Assessment & Plan  Encounter for health maintenance examination         Primary hypertension      Call into clinic if home BP readings elevated prior to two week follow up  Orders:    losartan-hydrochlorothiazide (Hyzaar) 100-25 mg tablet; Take 1 tablet by mouth once daily.    CBC; Future    TSH with reflex to Free T4 if abnormal; Future    Comprehensive Metabolic Panel; Future  Orders:     metoprolol succinate XL (Toprol-XL) 50 mg 24 hr tablet; Take 1 tablet (50 mg) by mouth once daily  Hyperlipidemia, unspecified hyperlipidemia type    Diet modifications, daily exercise  Labs ordered today    Orders:    rosuvastatin (Crestor) 10 mg tablet; Take 1 tablet (10 mg) by mouth once daily.    Lipid Panel; Future    Uncomplicated asthma, unspecified asthma severity, unspecified whether persistent ([facility])    No symptoms at present, would like refill in case symptoms during summer history of known summer triggers -pollen  Orders:    albuterol (Ventolin HFA) 90 mcg/actuation inhaler; Inhale 2 puffs every 4 hours if needed for wheezing or shortness of breath.    VT (ventricular tachycardia) (Multi)    Has follow up with cardiology joshua ferrer scheduled May    Orders:    metoprolol succinate XL (Toprol-XL) 50 mg 24 hr tablet; Take 1 tablet (50 mg) by mouth once daily.    Palpitations    Orders:    metoprolol succinate XL (Toprol-XL) 50 mg 24 hr tablet; Take 1 tablet (50 mg) by mouth once daily.    ANA PAULA (generalized anxiety disorder)    Endorses increased situational stressors  Recommend cognitive behavioral therapy  Will call [facility] for CBT    Will send labs-TSH, cmp, cbc  Does not want daily medication for anxiety at present  Would like to try PRN   Trial hydroxyzine, follow up in two weeks to review labs, response     Orders:    hydrOXYzine HCL (Atarax) 25 mg tablet; Hydroxyzine 25 mg at night if needed for anxiety or insomnia    Antibiotic-induced yeast infection    Will treat empirically due to previous history of similar symptoms after antibiotic course. Completed doxycyline 6 week course few days ago.   Orders:    fluconazole (Diflucan) 150 mg tablet; Take 1 tablet (150 mg) by mouth 1 time for 1 dose.    Arthralgia of right hand    Requests RF for joint pain in hand  Right handed, worse with movement and use. No redness, swelling. Discussed osteoarthritis,  would like RF added to  lab    Orders:    Rheumatoid Factor; Future    FIDE with Reflex to DHARA; Future    History of head injury    Residual tenderness to area, no open areas, no signs of infection on Physical exam. Continue follow up with workman comp provider         Encounter for screening mammogram for malignant neoplasm of breast    Orders:    BI US breast complete right; Future    BI mammo bilateral diagnostic; Future    Encounter for screening for malignant neoplasm of colon    Orders:    Colonoscopy Screening; Average Risk Patient; Future

## 2025-04-03 ENCOUNTER — APPOINTMENT (OUTPATIENT)
Dept: CARDIOLOGY | Facility: HOSPITAL | Age: 60
End: 2025-04-03
Payer: COMMERCIAL

## 2025-04-15 DIAGNOSIS — R00.2 PALPITATIONS: ICD-10-CM

## 2025-04-15 DIAGNOSIS — I47.20 VT (VENTRICULAR TACHYCARDIA) (MULTI): ICD-10-CM

## 2025-04-15 RX ORDER — METOPROLOL SUCCINATE 50 MG/1
50 TABLET, EXTENDED RELEASE ORAL DAILY
Qty: 30 TABLET | Refills: 0 | Status: SHIPPED | OUTPATIENT
Start: 2025-04-15 | End: 2025-04-16 | Stop reason: SDUPTHER

## 2025-04-16 ENCOUNTER — APPOINTMENT (OUTPATIENT)
Dept: PRIMARY CARE | Facility: CLINIC | Age: 60
End: 2025-04-16

## 2025-04-16 VITALS
DIASTOLIC BLOOD PRESSURE: 85 MMHG | OXYGEN SATURATION: 96 % | HEART RATE: 76 BPM | TEMPERATURE: 98.4 F | WEIGHT: 166 LBS | BODY MASS INDEX: 30.55 KG/M2 | HEIGHT: 62 IN | SYSTOLIC BLOOD PRESSURE: 162 MMHG

## 2025-04-16 DIAGNOSIS — I47.20 VT (VENTRICULAR TACHYCARDIA) (MULTI): ICD-10-CM

## 2025-04-16 DIAGNOSIS — M25.541 ARTHRALGIA OF RIGHT HAND: ICD-10-CM

## 2025-04-16 DIAGNOSIS — B37.9 ANTIBIOTIC-INDUCED YEAST INFECTION: ICD-10-CM

## 2025-04-16 DIAGNOSIS — Z12.11 ENCOUNTER FOR SCREENING FOR MALIGNANT NEOPLASM OF COLON: ICD-10-CM

## 2025-04-16 DIAGNOSIS — T36.95XA ANTIBIOTIC-INDUCED YEAST INFECTION: ICD-10-CM

## 2025-04-16 DIAGNOSIS — Z12.31 ENCOUNTER FOR SCREENING MAMMOGRAM FOR MALIGNANT NEOPLASM OF BREAST: ICD-10-CM

## 2025-04-16 DIAGNOSIS — E78.5 HYPERLIPIDEMIA, UNSPECIFIED HYPERLIPIDEMIA TYPE: ICD-10-CM

## 2025-04-16 DIAGNOSIS — F41.1 GAD (GENERALIZED ANXIETY DISORDER): ICD-10-CM

## 2025-04-16 DIAGNOSIS — Z00.00 ENCOUNTER FOR HEALTH MAINTENANCE EXAMINATION: Primary | ICD-10-CM

## 2025-04-16 DIAGNOSIS — I10 PRIMARY HYPERTENSION: ICD-10-CM

## 2025-04-16 DIAGNOSIS — J45.909 UNCOMPLICATED ASTHMA, UNSPECIFIED ASTHMA SEVERITY, UNSPECIFIED WHETHER PERSISTENT (HHS-HCC): ICD-10-CM

## 2025-04-16 DIAGNOSIS — R00.2 PALPITATIONS: ICD-10-CM

## 2025-04-16 DIAGNOSIS — Z87.828 HISTORY OF HEAD INJURY: ICD-10-CM

## 2025-04-16 PROCEDURE — 99215 OFFICE O/P EST HI 40 MIN: CPT | Performed by: NURSE PRACTITIONER

## 2025-04-16 PROCEDURE — 3077F SYST BP >= 140 MM HG: CPT | Performed by: NURSE PRACTITIONER

## 2025-04-16 PROCEDURE — 3079F DIAST BP 80-89 MM HG: CPT | Performed by: NURSE PRACTITIONER

## 2025-04-16 PROCEDURE — 3008F BODY MASS INDEX DOCD: CPT | Performed by: NURSE PRACTITIONER

## 2025-04-16 RX ORDER — ROSUVASTATIN CALCIUM 10 MG/1
10 TABLET, COATED ORAL DAILY
Qty: 90 TABLET | Refills: 1 | Status: SHIPPED | OUTPATIENT
Start: 2025-04-16

## 2025-04-16 RX ORDER — METOPROLOL SUCCINATE 50 MG/1
50 TABLET, EXTENDED RELEASE ORAL DAILY
Qty: 30 TABLET | Refills: 2 | Status: SHIPPED | OUTPATIENT
Start: 2025-04-16

## 2025-04-16 RX ORDER — LOSARTAN POTASSIUM AND HYDROCHLOROTHIAZIDE 25; 100 MG/1; MG/1
1 TABLET ORAL DAILY
Qty: 90 TABLET | Refills: 1 | Status: SHIPPED | OUTPATIENT
Start: 2025-04-16 | End: 2026-04-16

## 2025-04-16 RX ORDER — ALBUTEROL SULFATE 90 UG/1
2 INHALANT RESPIRATORY (INHALATION) EVERY 4 HOURS PRN
Qty: 8 G | Refills: 1 | Status: SHIPPED | OUTPATIENT
Start: 2025-04-16 | End: 2025-05-16

## 2025-04-16 RX ORDER — FLUCONAZOLE 150 MG/1
150 TABLET ORAL ONCE
Qty: 1 TABLET | Refills: 0 | Status: SHIPPED | OUTPATIENT
Start: 2025-04-16 | End: 2025-04-16

## 2025-04-16 RX ORDER — HYDROXYZINE HYDROCHLORIDE 25 MG/1
TABLET, FILM COATED ORAL
Qty: 14 TABLET | Refills: 0 | Status: SHIPPED | OUTPATIENT
Start: 2025-04-16

## 2025-04-16 ASSESSMENT — ANXIETY QUESTIONNAIRES
7. FEELING AFRAID AS IF SOMETHING AWFUL MIGHT HAPPEN: SEVERAL DAYS
3. WORRYING TOO MUCH ABOUT DIFFERENT THINGS: NEARLY EVERY DAY
GAD7 TOTAL SCORE: 19
1. FEELING NERVOUS, ANXIOUS, OR ON EDGE: NEARLY EVERY DAY
2. NOT BEING ABLE TO STOP OR CONTROL WORRYING: NEARLY EVERY DAY
5. BEING SO RESTLESS THAT IT IS HARD TO SIT STILL: NEARLY EVERY DAY
IF YOU CHECKED OFF ANY PROBLEMS ON THIS QUESTIONNAIRE, HOW DIFFICULT HAVE THESE PROBLEMS MADE IT FOR YOU TO DO YOUR WORK, TAKE CARE OF THINGS AT HOME, OR GET ALONG WITH OTHER PEOPLE: EXTREMELY DIFFICULT
6. BECOMING EASILY ANNOYED OR IRRITABLE: NEARLY EVERY DAY
4. TROUBLE RELAXING: NEARLY EVERY DAY

## 2025-04-16 ASSESSMENT — ENCOUNTER SYMPTOMS
NUMBNESS: 0
COUGH: 0
WEAKNESS: 0
ARTHRALGIAS: 1
CHILLS: 0
FEVER: 0
NERVOUS/ANXIOUS: 1
WHEEZING: 0
NEUROLOGICAL NEGATIVE: 1
SHORTNESS OF BREATH: 0
TREMORS: 0
DIFFICULTY URINATING: 0
HEADACHES: 0
DYSURIA: 0
DIZZINESS: 0
WOUND: 0
APPETITE CHANGE: 0

## 2025-04-16 ASSESSMENT — PATIENT HEALTH QUESTIONNAIRE - PHQ9
SUM OF ALL RESPONSES TO PHQ9 QUESTIONS 1 AND 2: 0
1. LITTLE INTEREST OR PLEASURE IN DOING THINGS: NOT AT ALL
2. FEELING DOWN, DEPRESSED OR HOPELESS: NOT AT ALL

## 2025-04-16 NOTE — ASSESSMENT & PLAN NOTE
Call into clinic if home BP readings elevated prior to two week follow up  Orders:    losartan-hydrochlorothiazide (Hyzaar) 100-25 mg tablet; Take 1 tablet by mouth once daily.    CBC; Future    TSH with reflex to Free T4 if abnormal; Future    Comprehensive Metabolic Panel; Future  Orders:    metoprolol succinate XL (Toprol-XL) 50 mg 24 hr tablet; Take 1 tablet (50 mg) by mouth once daily

## 2025-04-16 NOTE — ASSESSMENT & PLAN NOTE
Has follow up with cardiology joshua ferrer scheduled May    Orders:    metoprolol succinate XL (Toprol-XL) 50 mg 24 hr tablet; Take 1 tablet (50 mg) by mouth once daily.

## 2025-04-16 NOTE — ASSESSMENT & PLAN NOTE
No symptoms at present, would like refill in case symptoms during summer history of known summer triggers -pollen  Orders:    albuterol (Ventolin HFA) 90 mcg/actuation inhaler; Inhale 2 puffs every 4 hours if needed for wheezing or shortness of breath.

## 2025-04-16 NOTE — ASSESSMENT & PLAN NOTE
Diet modifications, daily exercise  Labs ordered today    Orders:    rosuvastatin (Crestor) 10 mg tablet; Take 1 tablet (10 mg) by mouth once daily.    Lipid Panel; Future

## 2025-04-27 LAB
ALBUMIN SERPL-MCNC: 4.9 G/DL (ref 3.6–5.1)
ALP SERPL-CCNC: 84 U/L (ref 37–153)
ALT SERPL-CCNC: 17 U/L (ref 6–29)
ANA SER QL IF: NORMAL
ANION GAP SERPL CALCULATED.4IONS-SCNC: 8 MMOL/L (CALC) (ref 7–17)
AST SERPL-CCNC: 15 U/L (ref 10–35)
BILIRUB SERPL-MCNC: 0.5 MG/DL (ref 0.2–1.2)
BUN SERPL-MCNC: 16 MG/DL (ref 7–25)
CALCIUM SERPL-MCNC: 10.1 MG/DL (ref 8.6–10.4)
CHLORIDE SERPL-SCNC: 100 MMOL/L (ref 98–110)
CHOLEST SERPL-MCNC: 175 MG/DL
CHOLEST/HDLC SERPL: 3 (CALC)
CO2 SERPL-SCNC: 31 MMOL/L (ref 20–32)
CREAT SERPL-MCNC: 0.68 MG/DL (ref 0.5–1.03)
EGFRCR SERPLBLD CKD-EPI 2021: 100 ML/MIN/1.73M2
ERYTHROCYTE [DISTWIDTH] IN BLOOD BY AUTOMATED COUNT: 12.1 % (ref 11–15)
GLUCOSE SERPL-MCNC: 108 MG/DL (ref 65–99)
HCT VFR BLD AUTO: 43.3 % (ref 35–45)
HDLC SERPL-MCNC: 58 MG/DL
HGB BLD-MCNC: 14.8 G/DL (ref 11.7–15.5)
LDLC SERPL CALC-MCNC: 96 MG/DL (CALC)
MCH RBC QN AUTO: 33.6 PG (ref 27–33)
MCHC RBC AUTO-ENTMCNC: 34.2 G/DL (ref 32–36)
MCV RBC AUTO: 98.4 FL (ref 80–100)
NONHDLC SERPL-MCNC: 117 MG/DL (CALC)
PLATELET # BLD AUTO: 342 THOUSAND/UL (ref 140–400)
PMV BLD REES-ECKER: 10.9 FL (ref 7.5–12.5)
POTASSIUM SERPL-SCNC: 4.3 MMOL/L (ref 3.5–5.3)
PROT SERPL-MCNC: 7.9 G/DL (ref 6.1–8.1)
RBC # BLD AUTO: 4.4 MILLION/UL (ref 3.8–5.1)
RHEUMATOID FACT SERPL-ACNC: NORMAL [IU]/ML
SODIUM SERPL-SCNC: 139 MMOL/L (ref 135–146)
TRIGL SERPL-MCNC: 113 MG/DL
TSH SERPL-ACNC: 1.48 MIU/L (ref 0.4–4.5)
WBC # BLD AUTO: 8.3 THOUSAND/UL (ref 3.8–10.8)

## 2025-04-27 NOTE — PROGRESS NOTES
"Subjective   Reason for Visit: Anya Zaragoza is an 59 y.o. female here for follow up.         HPI  Anya \"Marisela\" is here for anxiety and BP follow up. Seen for annual wellness exam and medication refills on 4/16/24. Had reported increased situational anxiety- ANA PAULA 7 score 19 on 4/16/24. Had historically been treated with xanax, fluoxetine for anxiety/depression. Did not like how made feel and has been off medication. Used medical marijuana which seemed to help. Now can not afford. Referred to CBT, prescribed trial of as needed hydroxyzine. Here as follow up to monitor response. Since last visit has called [facility] for counseling, placed on waiting list. Life stressors persist. Hydroxyzine helping sleep at night, still very anxious during day.                        #ANA PAULA  Today ANA PAULA 7-15  ANA PAULA 7 19 4/26/25  Current RX: hydroxyzine 25 mg prn  Historical RX: xanax, fluoxetine          #Essential HTN     Here for BP follow up. Last in office reading 4/16/25 160/82. Possible etiology -increased situational anxiety as ANA PAULA 7 that day 19. Recommended home BP monitoring and return for results review and BP recheck.   Previous office BP reading -160/82  131/84 4/4/24  Today BP reading 148/80 upon arrival  Did not perform home BP monitoring  Will start tomorrow- has home machine and will record       Current RX: Losartan-Hydrochlorothiazide 100-25 mg daily  Metroprolol succinate 50 mg XL daily  Egfr-100 4/26/25    ASCVD risk 11.6%               Rosuvastatin, asa  Calcium CT score  >200  ECHO 2/22/24-normal LVSF, EF 60-65%  Nuclear Stress test -normal 1/19/24    #Right hand/finger joint pain  Intermittent discomforts to different areas at times  Right hand dominate  Uses hand frequently -peeling, fine motor repetitive movements  Denies redness, swelling, heat to any site that is painful-points to finger joints, base of thumb as moving locations  Denies associate systemic symptoms-denies fever, chills " malaise  Tylenol/ibuprofen at times  Generalized aches  FIDE, RF negative  Worse with movement, improved with rest    Current Providers  Specialists: I have reviewed specialist-related care of the patient in the medical record.                                                               Health Maintenance Due   Topic Date Due    Yearly Adult Physical  Never done    HIV Screening  Never done    Colorectal Cancer Screening  Never done    MMR Vaccines (1 of 1 - Standard series) Never done    Hepatitis C Screening  Never done    Hepatitis B Vaccines (1 of 3 - 19+ 3-dose series) Never done    Pneumococcal Vaccine (1 of 2 - PCV) Never done    Cervical Cancer Screening  Never done    DTaP/Tdap/Td Vaccines (1 - Tdap) Never done    Zoster Vaccines (1 of 2) Never done    Mammogram  09/01/2024    COVID-19 Vaccine (1 - 2024-25 season) Never done       Allergies   Allergen Reactions    Penicillin Anaphylaxis    Sulfa (Sulfonamide Antibiotics) Anaphylaxis    Tetanus Toxoid Anaphylaxis    Codeine Hives               Office Visit on 04/16/2025   Component Date Value Ref Range Status    WHITE BLOOD CELL COUNT 04/26/2025 8.3  3.8 - 10.8 Thousand/uL Final    RED BLOOD CELL COUNT 04/26/2025 4.40  3.80 - 5.10 Million/uL Final    HEMOGLOBIN 04/26/2025 14.8  11.7 - 15.5 g/dL Final    HEMATOCRIT 04/26/2025 43.3  35.0 - 45.0 % Final    MCV 04/26/2025 98.4  80.0 - 100.0 fL Final    MCH 04/26/2025 33.6 (H)  27.0 - 33.0 pg Final    MCHC 04/26/2025 34.2  32.0 - 36.0 g/dL Final    Comment: For adults, a slight decrease in the calculated MCHC  value (in the range of 30 to 32 g/dL) is most likely  not clinically significant; however, it should be  interpreted with caution in correlation with other  red cell parameters and the patient's clinical  condition.      RDW 04/26/2025 12.1  11.0 - 15.0 % Final    PLATELET COUNT 04/26/2025 342  140 - 400 Thousand/uL Final    MPV 04/26/2025 10.9  7.5 - 12.5 fL Final    TSH W/REFLEX TO FT4 04/26/2025 1.48   0.40 - 4.50 mIU/L Final    GLUCOSE 04/26/2025 108 (H)  65 - 99 mg/dL Final    Comment:               Fasting reference interval     For someone without known diabetes, a glucose value  between 100 and 125 mg/dL is consistent with  prediabetes and should be confirmed with a  follow-up test.         UREA NITROGEN (BUN) 04/26/2025 16  7 - 25 mg/dL Final    CREATININE 04/26/2025 0.68  0.50 - 1.03 mg/dL Final    EGFR 04/26/2025 100  > OR = 60 mL/min/1.73m2 Final    SODIUM 04/26/2025 139  135 - 146 mmol/L Final    POTASSIUM 04/26/2025 4.3  3.5 - 5.3 mmol/L Final    CHLORIDE 04/26/2025 100  98 - 110 mmol/L Final    CARBON DIOXIDE 04/26/2025 31  20 - 32 mmol/L Final    ELECTROLYTE BALANCE 04/26/2025 8  7 - 17 mmol/L (calc) Final    CALCIUM 04/26/2025 10.1  8.6 - 10.4 mg/dL Final    PROTEIN, TOTAL 04/26/2025 7.9  6.1 - 8.1 g/dL Final    ALBUMIN 04/26/2025 4.9  3.6 - 5.1 g/dL Final    BILIRUBIN, TOTAL 04/26/2025 0.5  0.2 - 1.2 mg/dL Final    ALKALINE PHOSPHATASE 04/26/2025 84  37 - 153 U/L Final    AST 04/26/2025 15  10 - 35 U/L Final    ALT 04/26/2025 17  6 - 29 U/L Final    CHOLESTEROL, TOTAL 04/26/2025 175  <200 mg/dL Final    HDL CHOLESTEROL 04/26/2025 58  > OR = 50 mg/dL Final    TRIGLYCERIDES 04/26/2025 113  <150 mg/dL Final    LDL-CHOLESTEROL 04/26/2025 96  mg/dL (calc) Final    Comment: Reference range: <100     Desirable range <100 mg/dL for primary prevention;    <70 mg/dL for patients with CHD or diabetic patients   with > or = 2 CHD risk factors.     LDL-C is now calculated using the Yasmany   calculation, which is a validated novel method providing   better accuracy than the Friedewald equation in the   estimation of LDL-C.   Samuel OWENS et al. SOL. 2013;310(19): 7645-6780   (http://education.Miiix.com/faq/WBX927)      CHOL/HDLC RATIO 04/26/2025 3.0  <5.0 (calc) Final    NON HDL CHOLESTEROL 04/26/2025 117  <130 mg/dL (calc) Final    Comment: For patients with diabetes plus 1 major ASCVD risk  "  factor, treating to a non-HDL-C goal of <100 mg/dL   (LDL-C of <70 mg/dL) is considered a therapeutic   option.      RHEUMATOID FACTOR 04/26/2025 <10  <14 IU/mL Final    FIDE SCREEN, IFA 04/26/2025 NEGATIVE  NEGATIVE Final    Comment: FIDE IFA is a first line screen for detecting the  presence of up to approximately 150 autoantibodies in  various autoimmune diseases. A negative FIDE IFA result  suggests an FIDE-associated autoimmune disease is not  present at this time, and does not reflex further. If  there is high clinical suspicion for Sjogren's syndrome,  testing for anti-SS-A/Ro antibody should be considered.  Anti-Jennifer-1 antibody should be considered for clinically  suspected inflammatory myopathies.     AC-0: Negative     International Consensus on FIDE Patterns  (https://doi.org/10.1515/ulnx-9540-8290)     For additional information, please refer to  http://education.Moxtra/faq/ECD239  (This link is being provided for informational/  educational purposes only.)               Patient Care Team:  SHALINI Mulligan-CNP as PCP - General (Internal Medicine)     Review of Systems   Constitutional:  Negative for appetite change and unexpected weight change.   Eyes:  Negative for visual disturbance.   Respiratory:  Negative for cough, chest tightness and shortness of breath.    Cardiovascular:  Negative for chest pain and leg swelling.   Musculoskeletal:  Positive for arthralgias (right hand discomfort).   Skin:  Negative for rash.   Psychiatric/Behavioral:  The patient is nervous/anxious.        Objective   Vitals:  /80   Pulse 82   Temp 36.8 °C (98.2 °F)   Ht 1.575 m (5' 2\")   Wt 75.2 kg (165 lb 12.8 oz)   LMP  (LMP Unknown)   SpO2 95%   BMI 30.33 kg/m²       History reviewed. No pertinent surgical history.    Current Outpatient Medications   Medication Instructions    albuterol (Ventolin HFA) 90 mcg/actuation inhaler 2 puffs, inhalation, Every 4 hours PRN    cholecalciferol (VITAMIN " D-3) 50 mcg, oral, Daily    diclofenac sodium (VOLTAREN) 4 g, Topical, 4 times daily PRN    hydrOXYzine HCL (Atarax) 25 mg tablet Hydroxyzine 25 mg at night if needed for anxiety or insomnia    losartan-hydrochlorothiazide (Hyzaar) 100-25 mg tablet 1 tablet, oral, Daily    metoprolol succinate XL (TOPROL-XL) 50 mg, oral, Daily    rosuvastatin (CRESTOR) 10 mg, oral, Daily    sertraline (ZOLOFT) 25 mg, oral, Daily       Physical Exam  Constitutional:       Appearance: She is not ill-appearing or toxic-appearing.   HENT:      Head:     Cardiovascular:      Rate and Rhythm: Normal rate and regular rhythm.   Pulmonary:      Effort: Pulmonary effort is normal.      Breath sounds: Normal breath sounds. No decreased air movement. No wheezing, rhonchi or rales.   Musculoskeletal:        Arms:       Right lower leg: No edema.      Left lower leg: No edema.   Skin:     General: Skin is warm.      Findings: No rash.   Neurological:      Mental Status: She is oriented to person, place, and time.   Psychiatric:         Mood and Affect: Mood is anxious. Mood is not depressed. Affect is not tearful or inappropriate.         Behavior: Behavior normal.         Thought Content: Thought content normal.      Comments:   PHQ 0  ANA PAULA 7 15         Assessment & Plan  ANA PAULA (generalized anxiety disorder)   Discussed trial of SSRI  Reviewed side effects  Given total of 4 resources for CBT -  Please call to establish counseling  Follow up in office in 6 weeks    Discussed treatment with SSRI  Important to start low dose and go slow -will require follow up in 6 weeks to monitor response  Important to take medication as directed, do not suddenly stop medication due to possible withdrawal symptoms, notify provider if need to stop for any reason and weaning will be discussed  Common side effects: nausea, insomnia, diarrhea, weight changes    generally well tolerated  All SSRI have black box warning for increased suicidal thoughts and behaviors,  increased risk for those younger than 25. Monitor abnormal thoughts or increased depression. Call or message provider for any increased depressive thoughts  SSRI can interact with other medications and lead to dangerous levels of serotonin caused serotonin syndrome- this can cause sx such as agitation, rapid heart rate, increased BP, muscle rigidity, elevated body temperature. Ask pharmacist when taking any medications including OTC ( such as cough medicines-dextromethorphan, herbals) to verify no interaction with your medication      Orders:    sertraline (Zoloft) 25 mg tablet; Take 1 tablet (25 mg) by mouth once daily.    Primary hypertension    148/80  In office reading improved from previous  Did not complete home BP monitoring as requested at last visit   Plan is to continue current regimen  Complete home BP monitoring twice daily and record, will call in one week to obtain reading information. If BP elevated prior to follow up call-message via my chart or call office         Has cardiology follow up 5/5/25            Arthralgia of right hand    Orders:    diclofenac sodium (Voltaren) 1 % gel; Apply 4.5 inches (4 g) topically 4 times a day as needed (joint pain to right hand/fingers).    XR hand right 1-2 views; Future    FIDE, RF negative  Trial localized gel  Consider ortho referral

## 2025-04-28 LAB
ALBUMIN SERPL-MCNC: 4.9 G/DL (ref 3.6–5.1)
ALP SERPL-CCNC: 84 U/L (ref 37–153)
ALT SERPL-CCNC: 17 U/L (ref 6–29)
ANA SER QL IF: NEGATIVE
ANION GAP SERPL CALCULATED.4IONS-SCNC: 8 MMOL/L (CALC) (ref 7–17)
AST SERPL-CCNC: 15 U/L (ref 10–35)
BILIRUB SERPL-MCNC: 0.5 MG/DL (ref 0.2–1.2)
BUN SERPL-MCNC: 16 MG/DL (ref 7–25)
CALCIUM SERPL-MCNC: 10.1 MG/DL (ref 8.6–10.4)
CHLORIDE SERPL-SCNC: 100 MMOL/L (ref 98–110)
CHOLEST SERPL-MCNC: 175 MG/DL
CHOLEST/HDLC SERPL: 3 (CALC)
CO2 SERPL-SCNC: 31 MMOL/L (ref 20–32)
CREAT SERPL-MCNC: 0.68 MG/DL (ref 0.5–1.03)
EGFRCR SERPLBLD CKD-EPI 2021: 100 ML/MIN/1.73M2
ERYTHROCYTE [DISTWIDTH] IN BLOOD BY AUTOMATED COUNT: 12.1 % (ref 11–15)
GLUCOSE SERPL-MCNC: 108 MG/DL (ref 65–99)
HCT VFR BLD AUTO: 43.3 % (ref 35–45)
HDLC SERPL-MCNC: 58 MG/DL
HGB BLD-MCNC: 14.8 G/DL (ref 11.7–15.5)
LDLC SERPL CALC-MCNC: 96 MG/DL (CALC)
MCH RBC QN AUTO: 33.6 PG (ref 27–33)
MCHC RBC AUTO-ENTMCNC: 34.2 G/DL (ref 32–36)
MCV RBC AUTO: 98.4 FL (ref 80–100)
NONHDLC SERPL-MCNC: 117 MG/DL (CALC)
PLATELET # BLD AUTO: 342 THOUSAND/UL (ref 140–400)
PMV BLD REES-ECKER: 10.9 FL (ref 7.5–12.5)
POTASSIUM SERPL-SCNC: 4.3 MMOL/L (ref 3.5–5.3)
PROT SERPL-MCNC: 7.9 G/DL (ref 6.1–8.1)
RBC # BLD AUTO: 4.4 MILLION/UL (ref 3.8–5.1)
RHEUMATOID FACT SERPL-ACNC: <10 IU/ML
SODIUM SERPL-SCNC: 139 MMOL/L (ref 135–146)
TRIGL SERPL-MCNC: 113 MG/DL
TSH SERPL-ACNC: 1.48 MIU/L (ref 0.4–4.5)
WBC # BLD AUTO: 8.3 THOUSAND/UL (ref 3.8–10.8)

## 2025-04-30 ENCOUNTER — APPOINTMENT (OUTPATIENT)
Dept: PRIMARY CARE | Facility: CLINIC | Age: 60
End: 2025-04-30
Payer: COMMERCIAL

## 2025-04-30 VITALS
WEIGHT: 165.8 LBS | BODY MASS INDEX: 30.51 KG/M2 | DIASTOLIC BLOOD PRESSURE: 80 MMHG | HEIGHT: 62 IN | TEMPERATURE: 98.2 F | OXYGEN SATURATION: 95 % | HEART RATE: 82 BPM | SYSTOLIC BLOOD PRESSURE: 148 MMHG

## 2025-04-30 DIAGNOSIS — I10 PRIMARY HYPERTENSION: ICD-10-CM

## 2025-04-30 DIAGNOSIS — F41.1 GAD (GENERALIZED ANXIETY DISORDER): Primary | ICD-10-CM

## 2025-04-30 DIAGNOSIS — M25.541 ARTHRALGIA OF RIGHT HAND: ICD-10-CM

## 2025-04-30 PROCEDURE — 3008F BODY MASS INDEX DOCD: CPT | Performed by: NURSE PRACTITIONER

## 2025-04-30 PROCEDURE — 3077F SYST BP >= 140 MM HG: CPT | Performed by: NURSE PRACTITIONER

## 2025-04-30 PROCEDURE — 99214 OFFICE O/P EST MOD 30 MIN: CPT | Performed by: NURSE PRACTITIONER

## 2025-04-30 PROCEDURE — 3079F DIAST BP 80-89 MM HG: CPT | Performed by: NURSE PRACTITIONER

## 2025-04-30 RX ORDER — SERTRALINE HYDROCHLORIDE 25 MG/1
25 TABLET, FILM COATED ORAL DAILY
Qty: 30 TABLET | Refills: 1 | Status: SHIPPED | OUTPATIENT
Start: 2025-04-30 | End: 2025-06-29

## 2025-04-30 RX ORDER — DICLOFENAC SODIUM 10 MG/G
4 GEL TOPICAL 4 TIMES DAILY PRN
Qty: 100 G | Refills: 1 | Status: SHIPPED | OUTPATIENT
Start: 2025-04-30

## 2025-04-30 ASSESSMENT — ANXIETY QUESTIONNAIRES
7. FEELING AFRAID AS IF SOMETHING AWFUL MIGHT HAPPEN: MORE THAN HALF THE DAYS
IF YOU CHECKED OFF ANY PROBLEMS ON THIS QUESTIONNAIRE, HOW DIFFICULT HAVE THESE PROBLEMS MADE IT FOR YOU TO DO YOUR WORK, TAKE CARE OF THINGS AT HOME, OR GET ALONG WITH OTHER PEOPLE: SOMEWHAT DIFFICULT
1. FEELING NERVOUS, ANXIOUS, OR ON EDGE: NEARLY EVERY DAY
3. WORRYING TOO MUCH ABOUT DIFFERENT THINGS: NEARLY EVERY DAY
4. TROUBLE RELAXING: NEARLY EVERY DAY
GAD7 TOTAL SCORE: 15
5. BEING SO RESTLESS THAT IT IS HARD TO SIT STILL: NOT AT ALL
6. BECOMING EASILY ANNOYED OR IRRITABLE: MORE THAN HALF THE DAYS
2. NOT BEING ABLE TO STOP OR CONTROL WORRYING: MORE THAN HALF THE DAYS

## 2025-04-30 ASSESSMENT — ENCOUNTER SYMPTOMS
SHORTNESS OF BREATH: 0
APPETITE CHANGE: 0
CHEST TIGHTNESS: 0
NERVOUS/ANXIOUS: 1
ARTHRALGIAS: 1
UNEXPECTED WEIGHT CHANGE: 0
COUGH: 0

## 2025-04-30 NOTE — PATIENT INSTRUCTIONS
#Anxiety  Please call to secure counseling. Would be helpful to manage thoughts during stressful time  Start sertraline 25 mg daily  Important to start low dose and go slow -will require follow up in 6 weeks to monitor response  Important to take medication as directed, do not suddenly stop medication due to possible withdrawal symptoms, notify provider if need to stop for any reason and weaning will be discussed  Common side effects: nausea, insomnia, diarrhea, weight changes-generally well tolerated  All SSRI have black box warning for increased suicidal thoughts and behaviors, increased risk for those younger than 25. Monitor abnormal thoughts or increased depression. Call or message provider for any increased depressive thoughts  SSRI can interact with other medications and lead to dangerous levels of serotonin caused serotonin syndrome- this can cause sx such as agitation, rapid heart rate, increased BP, muscle rigidity, elevated body temperature. Ask pharmacist when taking any medications including OTC ( such as cough medicines-dextromethorphan, herbals) to verify no interaction with your medication  #BP  Please start checking twice a day at home and write down. Continue to take metoprolol, losartan-hydrochlorothiazide, can bring BP readings to cardiology visit on 5/5/25. Will call in one week as follow up.   #Hand discomfort  Tylenol as needed, try topical diclofenac gel for pain, put order for xray to hand, call [phone] to schedule

## 2025-04-30 NOTE — ASSESSMENT & PLAN NOTE
148/80  In office reading improved from previous  Did not complete home BP monitoring as requested at last visit   Plan is to continue current regimen  Complete home BP monitoring twice daily and record, will call in one week to obtain reading information. If BP elevated prior to follow up call-message via my chart or call office         Has cardiology follow up 5/5/25

## 2025-05-04 NOTE — PROGRESS NOTES
[facility] Heart [facility] Vascular Electrophysiology    Patient Name: Anya Zaragoza  Patient : 1965    Referred for  NSSVT    History of Present Illness:  Anya Zaragoza is a 59 y.o. year old female patient with:    HTN  HLD  CCS (205.79 10/24/2023)  NSSVT with complaints of palpitations that happen daily and last 20-30 minutes.  Abnormal Zio showed 1 episode of wide complex tachycardia lasting 10 seconds with a HR of 194 bpm and 21 episodes of NSSVT lasting up to 17 beats and max rate of 200 bpm.  Pt is now on metoprolol and reports to feeling better, no palpitations for 2 weeks after she started taking metoprolol.    Patient was referred to EP due to palpitations and abnormal Zio.  Patient initially complained of heart pounding and heart racing palpitations that occur almost daily. It lasted up to 20-30 minutes. She reports lightheadedness but denies syncope. It started after she had COVID back in . She also reported anxiety. Zio shows 1 episode of wide complex tachycardia lasting 10 seconds with a HR of 194 bpm and 21 episodes of NSSVT lasting up to 17 beats and max rate of 200 bpm. Her diltiazem was discontinued and metoprolol was initiated.   Patient had an MRI with no abnormalities.     She is here today for a follow up. She reports being extremely anxious. During anxiety attacks, she feels her heart racing. Her ECG today shows Sinus rhythm, HR 73 bpm.    Past Medical History:  She has no past medical history on file.    Past Surgical History:  She has no past surgical history on file.      Social History:  She reports that she has been smoking cigarettes. She has a 11.3 pack-year smoking history. She has never used smokeless tobacco. She reports current alcohol use. She reports current drug use. Drug: Marijuana.    Family History:  Family History[1]     Allergies:  Penicillin, Sulfa (sulfonamide antibiotics), Tetanus toxoid, and Codeine    Outpatient Medications:  Current Outpatient  Medications   Medication Instructions    albuterol (Ventolin HFA) 90 mcg/actuation inhaler 2 puffs, inhalation, Every 4 hours PRN    cholecalciferol (VITAMIN D-3) 50 mcg, oral, Daily    diclofenac sodium (VOLTAREN) 4 g, Topical, 4 times daily PRN    hydrOXYzine HCL (Atarax) 25 mg tablet Hydroxyzine 25 mg at night if needed for anxiety or insomnia    losartan-hydrochlorothiazide (Hyzaar) 100-25 mg tablet 1 tablet, oral, Daily    metoprolol succinate XL (TOPROL-XL) 50 mg, oral, Daily    rosuvastatin (CRESTOR) 10 mg, oral, Daily    sertraline (ZOLOFT) 25 mg, oral, Daily        ROS:  A 14 point review of systems was done and is negative other than as stated in HPI    Physical Exam  Constitutional:       Appearance: Normal appearance.   Cardiovascular:      Rate and Rhythm: Normal rate and regular rhythm.      Heart sounds: No murmur heard.     No friction rub. No gallop.   Pulmonary:      Effort: Pulmonary effort is normal.      Breath sounds: Normal breath sounds.   Abdominal:      Palpations: Abdomen is soft.   Musculoskeletal:      Cervical back: Neck supple.   Neurological:      Mental Status: She is alert.   Psychiatric:         Mood and Affect: Mood normal.         Behavior: Behavior normal.         Vitals:  There were no vitals taken for this visit.       Labs:   CBC  Lab Results   Component Value Date    WBC 8.3 04/26/2025    HGB 14.8 04/26/2025    HCT 43.3 04/26/2025    MCV 98.4 04/26/2025     04/26/2025        Renal Function Panel  Lab Results   Component Value Date    GLUCOSE 108 (H) 04/26/2025     04/26/2025    K 4.3 04/26/2025     04/26/2025    CO2 31 04/26/2025    ANIONGAP 8 04/26/2025    BUN 16 04/26/2025    CREATININE 0.68 04/26/2025    GFRF >90 08/24/2023    CALCIUM 10.1 04/26/2025        CMP  Lab Results   Component Value Date    CALCIUM 10.1 04/26/2025    PROT 7.9 04/26/2025    ALBUMIN 4.9 04/26/2025    AST 15 04/26/2025    ALT 17 04/26/2025    ALKPHOS 84 04/26/2025    BILITOT 0.5  04/26/2025       TSH  Lab Results   Component Value Date    TSH 1.48 04/26/2025          Cardiac Testing:  ECG  05/05/2025  Sinus rhythm, HR 73 bpm.    Echocardiogram  02/22/2024  PHYSICIAN INTERPRETATION:  Left Ventricle: The left ventricular systolic function is normal, with an estimated ejection fraction of 60-65%. There are no regional wall motion abnormalities. The left ventricular cavity size is normal. Spectral Doppler shows a normal pattern of left ventricular diastolic filling.  Left Atrium: The left atrium is normal in size.  Right Ventricle: The right ventricle is normal in size. There is normal right ventricular global systolic function.  Right Atrium: The right atrium is normal in size.  Aortic Valve: The aortic valve is probably trileaflet. There is no evidence of aortic valve stenosis.  There is no evidence of aortic valve regurgitation. The peak instantaneous gradient of the aortic valve is 9.3 mmHg. The mean gradient of the aortic valve is 5.3 mmHg.  Mitral Valve: The mitral valve is normal in structure. There is mild mitral valve regurgitation.  Tricuspid Valve: The tricuspid valve is structurally normal. There is mild tricuspid regurgitation.  Pulmonic Valve: The pulmonic valve is structurally normal. There is no indication of pulmonic valve regurgitation.  Pericardium: There is no pericardial effusion noted.  Aorta: The aortic root is normal.  Pulmonary Artery: The tricuspid regurgitant velocity is 2.07 m/s, and with an estimated right atrial pressure of 10 mmHg, the estimated pulmonary artery pressure is normal with the RVSP at 27.1 mmHg.  Systemic Veins: The inferior vena cava appears to be of normal size.        CONCLUSIONS:   1. Left ventricular systolic function is normal with a 60-65% estimated ejection fraction.   2. There is mild mitral and tricuspid regurgitation.      Cardiac Imaging:  CT cardiac scoring wo IV contrast 10/24/2023     FINDINGS:  The score and distribution of calcium in  the coronary arteries is as  follows:      LM 0,  LAD 99.39,  LCx 0,  .4,      Total 205.79     Event monitor (01/2024)  Patient had a min HR of 64 bpm, max HR of 200 bpm, and avg HR of 91 bpm.  Predominant underlying rhythm was Sinus Rhythm. 1 run of Ventricular Tachycardia  occurred lasting 10.6 secs with a max rate of 194 bpm (avg 169 bpm). 21  Supraventricular Tachycardia runs occurred, the run with the fastest interval lasting  10 beats with a max rate of 200 bpm, the longest lasting 17 beats with an avg rate  of 128 bpm. Isolated SVEs were rare (<1.0%), SVE Couplets were rare (<1.0%), and  SVE Triplets were rare (<1.0%). Isolated VEs were rare (<1.0%), VE Couplets were  rare (<1.0%), and no VE Triplets were present.      Cardiac Imaging:  MR cardiac morphology and function w and wo IV contrast 03/26/2024     IMPRESSION:  1. The left ventricle is normal in size, shape, and has hyperdynamic  global systolic function. LVEF = 76%. There are no segmental wall  motion abnormalities.  Quantitative values are as noted above.  2. There are no findings to suggest prior ischemic damage or an  infiltrative process.  3. Normal aortic, mitral, and tricuspid valve function.    Cardiac Imaging:  CT cardiac scoring wo IV contrast 10/24/2023     FINDINGS:  The score and distribution of calcium in the coronary arteries is as  follows:      LM 0,  LAD 99.39,  LCx 0,  .4,      Total 205.79     Event monitor (01/2024)  Patient had a min HR of 64 bpm, max HR of 200 bpm, and avg HR of 91 bpm.  Predominant underlying rhythm was Sinus Rhythm. 1 run of Ventricular Tachycardia  occurred lasting 10.6 secs with a max rate of 194 bpm (avg 169 bpm). 21  Supraventricular Tachycardia runs occurred, the run with the fastest interval lasting  10 beats with a max rate of 200 bpm, the longest lasting 17 beats with an avg rate  of 128 bpm. Isolated SVEs were rare (<1.0%), SVE Couplets were rare (<1.0%), and  SVE Triplets were rare  (<1.0%). Isolated VEs were rare (<1.0%), VE Couplets were  rare (<1.0%), and no VE Triplets were present.       Assessment:       Patient referred to EP due to abnormal Zio and palpitations. Zio shows 1 episode of wide complex tachycardia lasting 10 seconds with a HR of 194 bpm and 21 episodes of NSSVT lasting up to 17 beats and max rate of 200 bpm. Patient is here today for a follow up. She reports being extremely anxious. During anxiety attacks, she feels her heart racing. Her ECG today shows sinus rhythm, HR 73 bpm.    Plan:  Will increase her metoprolol to 50mg bid. Will schedule a follow up.       [1] No family history on file.

## 2025-05-05 ENCOUNTER — OFFICE VISIT (OUTPATIENT)
Dept: CARDIOLOGY | Facility: HOSPITAL | Age: 60
End: 2025-05-05
Payer: COMMERCIAL

## 2025-05-05 VITALS
DIASTOLIC BLOOD PRESSURE: 88 MMHG | HEART RATE: 78 BPM | OXYGEN SATURATION: 96 % | BODY MASS INDEX: 30.12 KG/M2 | SYSTOLIC BLOOD PRESSURE: 177 MMHG | WEIGHT: 164.68 LBS

## 2025-05-05 DIAGNOSIS — F41.1 GAD (GENERALIZED ANXIETY DISORDER): ICD-10-CM

## 2025-05-05 DIAGNOSIS — I47.20 VT (VENTRICULAR TACHYCARDIA) (MULTI): ICD-10-CM

## 2025-05-05 DIAGNOSIS — I47.10 NONSUSTAINED PAROXYSMAL SUPRAVENTRICULAR TACHYCARDIA: Primary | ICD-10-CM

## 2025-05-05 DIAGNOSIS — R00.2 PALPITATIONS: ICD-10-CM

## 2025-05-05 LAB
ATRIAL RATE: 73 BPM
P AXIS: 62 DEGREES
P OFFSET: 201 MS
P ONSET: 155 MS
PR INTERVAL: 134 MS
Q ONSET: 222 MS
QRS COUNT: 12 BEATS
QRS DURATION: 66 MS
QT INTERVAL: 420 MS
QTC CALCULATION(BAZETT): 462 MS
QTC FREDERICIA: 448 MS
R AXIS: 48 DEGREES
T AXIS: 39 DEGREES
T OFFSET: 432 MS
VENTRICULAR RATE: 73 BPM

## 2025-05-05 PROCEDURE — 3079F DIAST BP 80-89 MM HG: CPT | Performed by: STUDENT IN AN ORGANIZED HEALTH CARE EDUCATION/TRAINING PROGRAM

## 2025-05-05 PROCEDURE — 99214 OFFICE O/P EST MOD 30 MIN: CPT | Performed by: STUDENT IN AN ORGANIZED HEALTH CARE EDUCATION/TRAINING PROGRAM

## 2025-05-05 PROCEDURE — 93005 ELECTROCARDIOGRAM TRACING: CPT | Performed by: STUDENT IN AN ORGANIZED HEALTH CARE EDUCATION/TRAINING PROGRAM

## 2025-05-05 PROCEDURE — 3077F SYST BP >= 140 MM HG: CPT | Performed by: STUDENT IN AN ORGANIZED HEALTH CARE EDUCATION/TRAINING PROGRAM

## 2025-05-05 PROCEDURE — 99214 OFFICE O/P EST MOD 30 MIN: CPT | Mod: 25 | Performed by: STUDENT IN AN ORGANIZED HEALTH CARE EDUCATION/TRAINING PROGRAM

## 2025-05-05 RX ORDER — HYDROXYZINE HYDROCHLORIDE 25 MG/1
TABLET, FILM COATED ORAL
Qty: 30 TABLET | Refills: 11 | Status: SHIPPED | OUTPATIENT
Start: 2025-05-05

## 2025-05-05 RX ORDER — METOPROLOL SUCCINATE 50 MG/1
50 TABLET, EXTENDED RELEASE ORAL 2 TIMES DAILY
Qty: 60 TABLET | Refills: 11 | Status: SHIPPED | OUTPATIENT
Start: 2025-05-05

## 2025-05-12 ENCOUNTER — TELEPHONE (OUTPATIENT)
Dept: PRIMARY CARE | Facility: CLINIC | Age: 60
End: 2025-05-12
Payer: COMMERCIAL

## 2025-05-12 DIAGNOSIS — M25.541 ARTHRALGIA OF RIGHT HAND: Primary | ICD-10-CM

## 2025-05-12 NOTE — TELEPHONE ENCOUNTER
Call as follow up to monitor response to sertraline for ANA PAULA. States initially had headaches as side effect, have self resolved. 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 BP and anxiety have improved with recent increase in metoprolol dose from cardiology for palpitations.  Now taking metoprolol twice a day and feels improved. Having insomnia, hydroxyzine ordered from cardiology, has not filled yet to try. Right hand not any different. Encouraged to get xray of right hand, ordered 4/30/25. States understanding and agrees with plan. Has follow up scheduled

## 2025-06-09 ENCOUNTER — HOSPITAL ENCOUNTER (OUTPATIENT)
Dept: RADIOLOGY | Facility: HOSPITAL | Age: 60
Discharge: HOME | End: 2025-06-09
Payer: COMMERCIAL

## 2025-06-09 DIAGNOSIS — M25.542 ARTHRALGIA OF HANDS, BILATERAL: ICD-10-CM

## 2025-06-09 DIAGNOSIS — M25.541 ARTHRALGIA OF HANDS, BILATERAL: ICD-10-CM

## 2025-06-09 PROCEDURE — 73130 X-RAY EXAM OF HAND: CPT | Mod: BILATERAL PROCEDURE | Performed by: RADIOLOGY

## 2025-06-09 PROCEDURE — 73130 X-RAY EXAM OF HAND: CPT | Mod: 50

## 2025-06-09 NOTE — PROGRESS NOTES
"Subjective   Reason for Visit: Anya Zaragoza is an 60 y.o. female here for follow up.          HPI  Anya \"Marisela\" is here for follow up anxiety and hypertension follow up. History of HTN, Hyperlipidemia, GERD, VT/Palpitations, asthma, depression, anxiety. Smokes cigarettes daily, 11 pack years. Last primary care visit 4/30/25 endorsed recent life stressors resulting in increased anxiety. Referred to counseling for cognitive behavioral therapy.  Started on sertraline 25 mg daily, hydroxzyine 25 mg prn. States has felt improvement in anxiety but feels could be better. Tolerating sertraline with no adverse side effects. Initially had headaches which resolved after first week of use. Has been headache free since. Taking hydroxyzine at night time which has improved trouble sleeping from anxiety. Did have 3 sessions of therapy and then had to stop due to cost. Insurance covered 3 sessions, further sessions out of pocket at least 50.00/session.    Has had interval follow up with cardiology 5/5/25 Dr. Gomez for palpitations. Metoprolol was increased to 50 mg bid. States has improved her palpitations. BP at home has been variable. 153/84.   Tolerating increased dose of metoprolol without adverse effects.   Denies chest pains, sob, dizziness, ankle swelling.      Had xray of hands-degenerative changes, osteophytes. Interested in ortho evaluation for managing pain options.                             5/12/2025      Patient Care Team:  SHALINI Mulligan-CNP as PCP - General (Internal Medicine)            Current Providers  Specialists: I have reviewed specialist-related care of the patient in the medical record.                 Health Maintenance Due   Topic Date Due    Yearly Adult Physical  Never done    HIV Screening  Never done    Colorectal Cancer Screening  Never done    MMR Vaccines (1 of 1 - Standard series) Never done    Hepatitis C Screening  Never done    Diabetes Screening  Never done    " Pneumococcal Vaccine (1 of 2 - PCV) Never done    Cervical Cancer Screening  Never done    DTaP/Tdap/Td Vaccines (1 - Tdap) Never done    Zoster Vaccines (1 of 2) Never done    Mammogram  09/01/2024    COVID-19 Vaccine (1 - 2024-25 season) Never done    RSV High Risk: (Elderly (60+) or Pregnant Population) (1 - Risk 60-74 years 1-dose series) Never done       RX Allergies[1]            Office Visit on 05/05/2025   Component Date Value Ref Range Status    Ventricular Rate 05/05/2025 73  BPM Preliminary    Atrial Rate 05/05/2025 73  BPM Preliminary    MI Interval 05/05/2025 134  ms Preliminary    QRS Duration 05/05/2025 66  ms Preliminary    QT Interval 05/05/2025 420  ms Preliminary    QTC Calculation(Bazett) 05/05/2025 462  ms Preliminary    P Axis 05/05/2025 62  degrees Preliminary    R Axis 05/05/2025 48  degrees Preliminary    T Axis 05/05/2025 39  degrees Preliminary    QRS Count 05/05/2025 12  beats Preliminary    Q Onset 05/05/2025 222  ms Preliminary    P Onset 05/05/2025 155  ms Preliminary    P Offset 05/05/2025 201  ms Preliminary    T Offset 05/05/2025 432  ms Preliminary    QTC Fredericia 05/05/2025 448  ms Preliminary   Office Visit on 04/16/2025   Component Date Value Ref Range Status    WHITE BLOOD CELL COUNT 04/26/2025 8.3  3.8 - 10.8 Thousand/uL Final    RED BLOOD CELL COUNT 04/26/2025 4.40  3.80 - 5.10 Million/uL Final    HEMOGLOBIN 04/26/2025 14.8  11.7 - 15.5 g/dL Final    HEMATOCRIT 04/26/2025 43.3  35.0 - 45.0 % Final    MCV 04/26/2025 98.4  80.0 - 100.0 fL Final    MCH 04/26/2025 33.6 (H)  27.0 - 33.0 pg Final    MCHC 04/26/2025 34.2  32.0 - 36.0 g/dL Final    Comment: For adults, a slight decrease in the calculated MCHC  value (in the range of 30 to 32 g/dL) is most likely  not clinically significant; however, it should be  interpreted with caution in correlation with other  red cell parameters and the patient's clinical  condition.      RDW 04/26/2025 12.1  11.0 - 15.0 % Final    PLATELET  COUNT 04/26/2025 342  140 - 400 Thousand/uL Final    MPV 04/26/2025 10.9  7.5 - 12.5 fL Final    TSH W/REFLEX TO FT4 04/26/2025 1.48  0.40 - 4.50 mIU/L Final    GLUCOSE 04/26/2025 108 (H)  65 - 99 mg/dL Final    Comment:               Fasting reference interval     For someone without known diabetes, a glucose value  between 100 and 125 mg/dL is consistent with  prediabetes and should be confirmed with a  follow-up test.         UREA NITROGEN (BUN) 04/26/2025 16  7 - 25 mg/dL Final    CREATININE 04/26/2025 0.68  0.50 - 1.03 mg/dL Final    EGFR 04/26/2025 100  > OR = 60 mL/min/1.73m2 Final    SODIUM 04/26/2025 139  135 - 146 mmol/L Final    POTASSIUM 04/26/2025 4.3  3.5 - 5.3 mmol/L Final    CHLORIDE 04/26/2025 100  98 - 110 mmol/L Final    CARBON DIOXIDE 04/26/2025 31  20 - 32 mmol/L Final    ELECTROLYTE BALANCE 04/26/2025 8  7 - 17 mmol/L (calc) Final    CALCIUM 04/26/2025 10.1  8.6 - 10.4 mg/dL Final    PROTEIN, TOTAL 04/26/2025 7.9  6.1 - 8.1 g/dL Final    ALBUMIN 04/26/2025 4.9  3.6 - 5.1 g/dL Final    BILIRUBIN, TOTAL 04/26/2025 0.5  0.2 - 1.2 mg/dL Final    ALKALINE PHOSPHATASE 04/26/2025 84  37 - 153 U/L Final    AST 04/26/2025 15  10 - 35 U/L Final    ALT 04/26/2025 17  6 - 29 U/L Final    CHOLESTEROL, TOTAL 04/26/2025 175  <200 mg/dL Final    HDL CHOLESTEROL 04/26/2025 58  > OR = 50 mg/dL Final    TRIGLYCERIDES 04/26/2025 113  <150 mg/dL Final    LDL-CHOLESTEROL 04/26/2025 96  mg/dL (calc) Final    Comment: Reference range: <100     Desirable range <100 mg/dL for primary prevention;    <70 mg/dL for patients with CHD or diabetic patients   with > or = 2 CHD risk factors.     LDL-C is now calculated using the Yasmany   calculation, which is a validated novel method providing   better accuracy than the Friedewald equation in the   estimation of LDL-C.   Samuel OWENS et al. SOL. 2013;310(19): 9939-9838   (http://education.Actifio.Skulpt/faq/IFE749)      CHOL/HDLC RATIO 04/26/2025 3.0  <5.0 (calc)  "Final    NON HDL CHOLESTEROL 04/26/2025 117  <130 mg/dL (calc) Final    Comment: For patients with diabetes plus 1 major ASCVD risk   factor, treating to a non-HDL-C goal of <100 mg/dL   (LDL-C of <70 mg/dL) is considered a therapeutic   option.      RHEUMATOID FACTOR 04/26/2025 <10  <14 IU/mL Final    FIDE SCREEN, IFA 04/26/2025 NEGATIVE  NEGATIVE Final    Comment: FIDE IFA is a first line screen for detecting the  presence of up to approximately 150 autoantibodies in  various autoimmune diseases. A negative FIDE IFA result  suggests an FIDE-associated autoimmune disease is not  present at this time, and does not reflex further. If  there is high clinical suspicion for Sjogren's syndrome,  testing for anti-SS-A/Ro antibody should be considered.  Anti-Jennifer-1 antibody should be considered for clinically  suspected inflammatory myopathies.     AC-0: Negative     International Consensus on FIDE Patterns  (https://doi.org/10.1515/ahut-0961-0354)     For additional information, please refer to  http://education.Pushkart.[employer]/faq/VXU513  (This link is being provided for informational/  educational purposes only.)               Patient Care Team:  BOBO Mulligan as PCP - General (Internal Medicine)     Review of Systems   Constitutional:  Negative for activity change, appetite change and unexpected weight change.   Respiratory:  Negative for shortness of breath.    Cardiovascular:  Positive for palpitations (generally improved with increased dose of metoprolol). Negative for chest pain and leg swelling.   Gastrointestinal:  Negative for nausea and vomiting.   Musculoskeletal:  Positive for arthralgias (hands).   Neurological:  Negative for dizziness, tremors and headaches.   Psychiatric/Behavioral:  The patient is nervous/anxious.        Objective   Vitals:  /86   Pulse 74   Temp 36.8 °C (98.3 °F)   Ht 1.575 m (5' 2\")   Wt 75.1 kg (165 lb 9.6 oz)   LMP  (LMP Unknown)   SpO2 96%   BMI 30.29 " kg/m²       Surgical History[2]    Current Outpatient Medications   Medication Instructions    albuterol (Ventolin HFA) 90 mcg/actuation inhaler 2 puffs, inhalation, Every 4 hours PRN    cholecalciferol (VITAMIN D-3) 50 mcg, oral, Daily    hydrOXYzine HCL (Atarax) 25 mg tablet Hydroxyzine 25 mg at night if needed for anxiety or insomnia    losartan-hydrochlorothiazide (Hyzaar) 100-25 mg tablet 1 tablet, oral, Daily    metoprolol succinate XL (TOPROL-XL) 50 mg, oral, 2 times daily    rosuvastatin (CRESTOR) 10 mg, oral, Daily    sertraline (ZOLOFT) 50 mg, oral, Daily       Physical Exam  Constitutional:       General: She is not in acute distress.     Appearance: She is not toxic-appearing.   Cardiovascular:      Rate and Rhythm: Normal rate and regular rhythm.   Pulmonary:      Effort: Pulmonary effort is normal. No respiratory distress.      Breath sounds: Normal breath sounds.   Musculoskeletal:      Right hand: Tenderness present. Normal capillary refill. Normal pulse.      Left hand: Normal capillary refill. Normal pulse.      Right lower leg: No edema.      Left lower leg: No edema.   Lymphadenopathy:      Cervical: No cervical adenopathy.   Skin:     General: Skin is warm.      Findings: No rash.   Neurological:      Mental Status: She is oriented to person, place, and time.   Psychiatric:         Attention and Perception: Attention normal.         Mood and Affect: Affect normal. Mood is anxious.         Speech: Speech normal.         Behavior: Behavior normal.         Thought Content: Thought content normal.      Comments: ANA PAULA 6  PHQ 2         Assessment & Plan  ANA PAULA (generalized anxiety disorder)  ANA PAULA 6   PHQ 2    Feels could still be better, would like to go up on sertraline dose. Increase to 50 mg daily. Follow up in 6 weeks  Reach out if any adverse effects prior to six week follow up  Orders:    sertraline (Zoloft) 25 mg tablet; Take 2 tablets (50 mg) by mouth once daily.    Primary hypertension  148/86 on  second check  162/85 previous visit bp    Losartan-Hydrochlorothiazide 100-25 mg daily   Metroprolol succinate 50 mg XL bid  Egfr-100 4/26/25  ASCVD risk 11.6%               Rosuvastatin, asa  Calcium CT score  >200  ECHO 2/22/24-normal LVSF, EF 60-65%  Nuclear Stress test -normal 1/19/24       Palpitations    Continue follow up cardiology    Metroprolol succinate XL 50 mg bid       Arthralgia of hands, bilateral    Orders:    Referral to Orthopedics and Sports Medicine; Future                         [1]   Allergies  Allergen Reactions    Penicillin Anaphylaxis    Sulfa (Sulfonamide Antibiotics) Anaphylaxis    Tetanus Toxoid Anaphylaxis    Codeine Hives   [2] History reviewed. No pertinent surgical history.

## 2025-06-11 ENCOUNTER — APPOINTMENT (OUTPATIENT)
Dept: PRIMARY CARE | Facility: CLINIC | Age: 60
End: 2025-06-11
Payer: COMMERCIAL

## 2025-06-11 VITALS
HEIGHT: 62 IN | BODY MASS INDEX: 30.47 KG/M2 | OXYGEN SATURATION: 96 % | WEIGHT: 165.6 LBS | SYSTOLIC BLOOD PRESSURE: 148 MMHG | HEART RATE: 74 BPM | TEMPERATURE: 98.3 F | DIASTOLIC BLOOD PRESSURE: 86 MMHG

## 2025-06-11 DIAGNOSIS — I10 PRIMARY HYPERTENSION: ICD-10-CM

## 2025-06-11 DIAGNOSIS — F41.1 GAD (GENERALIZED ANXIETY DISORDER): Primary | ICD-10-CM

## 2025-06-11 DIAGNOSIS — M25.542 ARTHRALGIA OF HANDS, BILATERAL: ICD-10-CM

## 2025-06-11 DIAGNOSIS — M25.541 ARTHRALGIA OF HANDS, BILATERAL: ICD-10-CM

## 2025-06-11 DIAGNOSIS — R00.2 PALPITATIONS: ICD-10-CM

## 2025-06-11 PROCEDURE — 3077F SYST BP >= 140 MM HG: CPT | Performed by: NURSE PRACTITIONER

## 2025-06-11 PROCEDURE — 3079F DIAST BP 80-89 MM HG: CPT | Performed by: NURSE PRACTITIONER

## 2025-06-11 PROCEDURE — 99214 OFFICE O/P EST MOD 30 MIN: CPT | Performed by: NURSE PRACTITIONER

## 2025-06-11 PROCEDURE — 3008F BODY MASS INDEX DOCD: CPT | Performed by: NURSE PRACTITIONER

## 2025-06-11 PROCEDURE — 4004F PT TOBACCO SCREEN RCVD TLK: CPT | Performed by: NURSE PRACTITIONER

## 2025-06-11 RX ORDER — SERTRALINE HYDROCHLORIDE 25 MG/1
50 TABLET, FILM COATED ORAL DAILY
Qty: 60 TABLET | Refills: 1 | Status: SHIPPED | OUTPATIENT
Start: 2025-06-11 | End: 2025-08-10

## 2025-06-11 ASSESSMENT — ENCOUNTER SYMPTOMS
VOMITING: 0
NERVOUS/ANXIOUS: 1
DIZZINESS: 0
ARTHRALGIAS: 1
NAUSEA: 0
SHORTNESS OF BREATH: 0
PALPITATIONS: 1
ACTIVITY CHANGE: 0
APPETITE CHANGE: 0
TREMORS: 0
HEADACHES: 0
UNEXPECTED WEIGHT CHANGE: 0

## 2025-06-11 ASSESSMENT — ANXIETY QUESTIONNAIRES
5. BEING SO RESTLESS THAT IT IS HARD TO SIT STILL: SEVERAL DAYS
IF YOU CHECKED OFF ANY PROBLEMS ON THIS QUESTIONNAIRE, HOW DIFFICULT HAVE THESE PROBLEMS MADE IT FOR YOU TO DO YOUR WORK, TAKE CARE OF THINGS AT HOME, OR GET ALONG WITH OTHER PEOPLE: NOT DIFFICULT AT ALL
4. TROUBLE RELAXING: SEVERAL DAYS
3. WORRYING TOO MUCH ABOUT DIFFERENT THINGS: SEVERAL DAYS
7. FEELING AFRAID AS IF SOMETHING AWFUL MIGHT HAPPEN: NOT AT ALL
GAD7 TOTAL SCORE: 6
1. FEELING NERVOUS, ANXIOUS, OR ON EDGE: SEVERAL DAYS
6. BECOMING EASILY ANNOYED OR IRRITABLE: SEVERAL DAYS
2. NOT BEING ABLE TO STOP OR CONTROL WORRYING: SEVERAL DAYS

## 2025-06-11 ASSESSMENT — PATIENT HEALTH QUESTIONNAIRE - PHQ9
2. FEELING DOWN, DEPRESSED OR HOPELESS: NOT AT ALL
SUM OF ALL RESPONSES TO PHQ9 QUESTIONS 1 AND 2: 2
1. LITTLE INTEREST OR PLEASURE IN DOING THINGS: MORE THAN HALF THE DAYS

## 2025-06-11 NOTE — ASSESSMENT & PLAN NOTE
148/86 on second check  162/85 previous visit bp    Losartan-Hydrochlorothiazide 100-25 mg daily   Metroprolol succinate 50 mg XL bid  Egfr-100 4/26/25  ASCVD risk 11.6%               Rosuvastatin, asa  Calcium CT score  >200  ECHO 2/22/24-normal LVSF, EF 60-65%  Nuclear Stress test -normal 1/19/24

## 2025-06-11 NOTE — PATIENT INSTRUCTIONS
Marisela- Anxiety is improved, will try increased dose of sertraline to 50 mg daily to achieve better control.   Important to start low go slow -will require follow up in 6 weeks to monitor response  Important to take medication as directed, do not suddenly stop medication due to possible withdrawal symptoms, notify provider if need to stop for any reason and weaning will be discussed  Common side effects: nausea, insomnia, diarrhea, weight changes, is generally well tolerated  All SSRI have black box warning for increased suicidal thoughts and behaviors, increased risk for those younger than 25. Monitor abnormal thoughts or increased depression. Call or message provider for any increased depressive thoughts  SSRI can interact with other medications and lead to dangerous levels of serotonin caused serotonin syndrome- this can cause sx such as agitation, rapid heart rate, increased BP, muscle rigidity, elevated body temperature. Ask pharmacist when taking any medications including OTC ( such as cough medicines-dextromethorphan, herbals) to verify no interaction with your medication  Hypertension/palpitations: continue current medication regimen. Monitor BP twice a day. Reach out for any abnormal trends. Follow up in 6 weeks    Hand pain: referral placed for orthopedic evaluation.

## 2025-06-23 ENCOUNTER — APPOINTMENT (OUTPATIENT)
Dept: RADIOLOGY | Facility: CLINIC | Age: 60
End: 2025-06-23
Payer: COMMERCIAL

## 2025-06-27 DIAGNOSIS — F41.1 GAD (GENERALIZED ANXIETY DISORDER): ICD-10-CM

## 2025-06-27 RX ORDER — SERTRALINE HYDROCHLORIDE 25 MG/1
25 TABLET, FILM COATED ORAL DAILY
Qty: 30 TABLET | Refills: 0 | Status: SHIPPED | OUTPATIENT
Start: 2025-06-27

## 2025-07-20 RX ORDER — SERTRALINE HYDROCHLORIDE 50 MG/1
50 TABLET, FILM COATED ORAL DAILY
Qty: 30 TABLET | Refills: 0 | Status: SHIPPED | OUTPATIENT
Start: 2025-07-20 | End: 2025-07-20 | Stop reason: SDUPTHER

## 2025-07-20 NOTE — PROGRESS NOTES
"Subjective   Reason for Visit: Anya Zaragoza is an 60 y.o. female here for follow up.          HPI  Anya \"Marisela\" Anya \"Marisela\" is here for follow up anxiety and hypertension follow up. History of HTN, Hyperlipidemia, GERD, VT/Palpitations, asthma, depression, anxiety. Smokes cigarettes daily, 11 pack years. Last primary care visit endorsed anxiety did improve on initial dose of sertraline 25 mg daily, but felt still not under control. Dose increased to 50 mg daily.  Here for follow up to assess therapeutic response and any side effects.Taking hydroxyzine at night time which has improved trouble sleeping from anxiety. Did have 3 sessions of therapy and then had to stop due to cost. Insurance covered 3 sessions, further sessions out of pocket at least 50.00/session.     Generalized anxiety disorder  ANA PAULA 5          (6 previous)  PHQ  0         (2 previous)   current rx sertraline 50 mg daily   hydroxyzine 25 bid as needed  Endorses feels improved on current regimen without identifying side effects      Hypertension  BP at home 130-140/60-80  Tolerating medications without adverse effects  Denies chest pain, sob, dizziness  Palpitation frequency has decreased with metoprolol             While here would like refill on prn albuterol inhaler. Has been prescribed it for mild intermittent upper airway symptoms-when gets a cold, exposed to dust or when air quality poor will feel upper airway tightness relieved with inhaler. Uses very infrequent-less than 5 x per year    Patient Care Team:  SHALINI Mulligan-CNP as PCP - General (Internal Medicine)                            Health Maintenance Due   Topic Date Due    Yearly Adult Physical  Never done    HIV Screening  Never done    Colorectal Cancer Screening  Never done    MMR Vaccines (1 of 1 - Standard series) Never done    Hepatitis C Screening  Never done    Pneumococcal Vaccine (1 of 2 - PCV) Never done    Cervical Cancer Screening  Never done    " DTaP/Tdap/Td Vaccines (1 - Tdap) Never done    Zoster Vaccines (1 of 2) Never done    Mammogram  09/01/2024    COVID-19 Vaccine (1 - 2024-25 season) Never done    Diabetes Screening  03/26/2025    RSV High Risk: (Elderly (60+) or Pregnant Population) (1 - Risk 60-74 years 1-dose series) Never done       RX Allergies[1]            No visits with results within 60 Day(s) from this visit.   Latest known visit with results is:   Office Visit on 05/05/2025   Component Date Value Ref Range Status    Ventricular Rate 05/05/2025 73  BPM Preliminary    Atrial Rate 05/05/2025 73  BPM Preliminary    CO Interval 05/05/2025 134  ms Preliminary    QRS Duration 05/05/2025 66  ms Preliminary    QT Interval 05/05/2025 420  ms Preliminary    QTC Calculation(Bazett) 05/05/2025 462  ms Preliminary    P Axis 05/05/2025 62  degrees Preliminary    R Axis 05/05/2025 48  degrees Preliminary    T Axis 05/05/2025 39  degrees Preliminary    QRS Count 05/05/2025 12  beats Preliminary    Q Onset 05/05/2025 222  ms Preliminary    P Onset 05/05/2025 155  ms Preliminary    P Offset 05/05/2025 201  ms Preliminary    T Offset 05/05/2025 432  ms Preliminary    QTC Fredericia 05/05/2025 448  ms Preliminary         Patient Care Team:  BOBO Mulligan as PCP - General (Internal Medicine)     Review of Systems   Constitutional:  Negative for activity change, appetite change and unexpected weight change.   Respiratory:  Negative for cough, shortness of breath and wheezing.    Cardiovascular:  Positive for palpitations (on occasion, improved with metoprolol, not as frequent). Negative for chest pain and leg swelling.   Gastrointestinal:  Negative for nausea and vomiting.   Musculoskeletal:  Positive for arthralgias (hand pain-improved with topical salve).   Skin:  Negative for rash.   Neurological:  Negative for tremors.   Psychiatric/Behavioral:  The patient is nervous/anxious.        Objective   Vitals:  /73   Pulse 72   Temp 36.8  "°C (98.3 °F)   Ht 1.575 m (5' 2\")   Wt 77.1 kg (170 lb)   LMP  (LMP Unknown)   SpO2 95%   BMI 31.09 kg/m²       Surgical History[2]    Current Outpatient Medications   Medication Instructions    albuterol (Ventolin HFA) 90 mcg/actuation inhaler 2 puffs, inhalation, Every 4 hours PRN    aspirin 81 mg, Daily    cholecalciferol (VITAMIN D-3) 50 mcg, oral, Daily    hydrOXYzine HCL (Atarax) 25 mg tablet Hydroxyzine 25 mg at night if needed for anxiety or insomnia    losartan-hydrochlorothiazide (Hyzaar) 100-25 mg tablet 1 tablet, oral, Daily    metoprolol succinate XL (TOPROL-XL) 50 mg, oral, 2 times daily    rosuvastatin (CRESTOR) 10 mg, oral, Daily    sertraline (ZOLOFT) 50 mg, oral, Daily       Physical Exam  Constitutional:       General: She is not in acute distress.     Appearance: She is not toxic-appearing.     Cardiovascular:      Rate and Rhythm: Normal rate and regular rhythm.      Heart sounds: Normal heart sounds, S1 normal and S2 normal.   Pulmonary:      Effort: Pulmonary effort is normal. No tachypnea or respiratory distress.      Breath sounds: No wheezing, rhonchi or rales.     Musculoskeletal:      Right hand: No swelling. Normal range of motion. Normal sensation. Normal pulse.      Left hand: No swelling. Normal range of motion. Normal sensation. Normal pulse.      Right lower leg: No edema.      Left lower leg: No edema.   Lymphadenopathy:      Cervical: No cervical adenopathy.     Skin:     General: Skin is warm.      Findings: No rash.     Neurological:      Mental Status: She is alert and oriented to person, place, and time.      Cranial Nerves: Cranial nerves 2-12 are intact.      Sensory: Sensation is intact.      Motor: Motor function is intact.     Psychiatric:         Attention and Perception: Attention normal.         Mood and Affect: Affect normal. Mood is anxious. Mood is not depressed. Affect is not tearful or inappropriate.         Speech: Speech normal.         Behavior: Behavior " normal.         Thought Content: Thought content normal.      Comments: ANA PAULA 5  PHQ 0         Assessment & Plan  ANA PAULA (generalized anxiety disorder)  Stable and improved on current regimen  Continue   Stress management  Healthy diet  Daily exercise  Follow up in 3 months  Orders:    sertraline (Zoloft) 50 mg tablet; Take 1 tablet (50 mg) by mouth once daily.    Primary hypertension  Stable  Continue current regimen  Home BP monitor      Losartan-Hydrochlorothiazide 100-25 mg daily  Metroprolol succinate 50 mg XL bid  Egfr-100 4/26/25  ASCVD risk 11.6%               Rosuvastatin, asa  Calcium CT score  >200  ECHO 2/22/24-normal LVSF, EF 60-65%  Nuclear Stress test -normal 1/19/24  Mild intermittent reactive airway disease ([facility])    Orders:    albuterol (Ventolin HFA) 90 mcg/actuation inhaler; Inhale 2 puffs every 4 hours if needed for wheezing or shortness of breath.                           [1]   Allergies  Allergen Reactions    Penicillin Anaphylaxis    Sulfa (Sulfonamide Antibiotics) Anaphylaxis    Tetanus Toxoid Anaphylaxis    Codeine Hives   [2] History reviewed. No pertinent surgical history.

## 2025-07-21 ENCOUNTER — PATIENT MESSAGE (OUTPATIENT)
Dept: PRIMARY CARE | Facility: CLINIC | Age: 60
End: 2025-07-21
Payer: COMMERCIAL

## 2025-07-23 ENCOUNTER — APPOINTMENT (OUTPATIENT)
Dept: PRIMARY CARE | Facility: CLINIC | Age: 60
End: 2025-07-23
Payer: COMMERCIAL

## 2025-07-23 VITALS
WEIGHT: 170 LBS | HEART RATE: 72 BPM | BODY MASS INDEX: 31.28 KG/M2 | TEMPERATURE: 98.3 F | DIASTOLIC BLOOD PRESSURE: 68 MMHG | OXYGEN SATURATION: 95 % | SYSTOLIC BLOOD PRESSURE: 140 MMHG | HEIGHT: 62 IN

## 2025-07-23 DIAGNOSIS — F41.1 GAD (GENERALIZED ANXIETY DISORDER): Primary | ICD-10-CM

## 2025-07-23 DIAGNOSIS — I10 PRIMARY HYPERTENSION: ICD-10-CM

## 2025-07-23 DIAGNOSIS — J45.20 MILD INTERMITTENT REACTIVE AIRWAY DISEASE (HHS-HCC): ICD-10-CM

## 2025-07-23 PROCEDURE — 3078F DIAST BP <80 MM HG: CPT | Performed by: NURSE PRACTITIONER

## 2025-07-23 PROCEDURE — 99214 OFFICE O/P EST MOD 30 MIN: CPT | Performed by: NURSE PRACTITIONER

## 2025-07-23 PROCEDURE — 3008F BODY MASS INDEX DOCD: CPT | Performed by: NURSE PRACTITIONER

## 2025-07-23 PROCEDURE — 3077F SYST BP >= 140 MM HG: CPT | Performed by: NURSE PRACTITIONER

## 2025-07-23 RX ORDER — ASPIRIN 81 MG/1
81 TABLET ORAL DAILY
COMMUNITY

## 2025-07-23 RX ORDER — ALBUTEROL SULFATE 90 UG/1
2 INHALANT RESPIRATORY (INHALATION) EVERY 4 HOURS PRN
Qty: 8 G | Refills: 1 | Status: SHIPPED | OUTPATIENT
Start: 2025-07-23 | End: 2025-08-22

## 2025-07-23 RX ORDER — SERTRALINE HYDROCHLORIDE 50 MG/1
50 TABLET, FILM COATED ORAL DAILY
Qty: 30 TABLET | Refills: 2 | Status: SHIPPED | OUTPATIENT
Start: 2025-07-23 | End: 2025-10-21

## 2025-07-23 ASSESSMENT — ENCOUNTER SYMPTOMS
APPETITE CHANGE: 0
NAUSEA: 0
UNEXPECTED WEIGHT CHANGE: 0
VOMITING: 0
ACTIVITY CHANGE: 0
SHORTNESS OF BREATH: 0
TREMORS: 0
ARTHRALGIAS: 1
COUGH: 0
WHEEZING: 0
PALPITATIONS: 1
NERVOUS/ANXIOUS: 1

## 2025-07-23 ASSESSMENT — ANXIETY QUESTIONNAIRES
7. FEELING AFRAID AS IF SOMETHING AWFUL MIGHT HAPPEN: NOT AT ALL
GAD7 TOTAL SCORE: 5
4. TROUBLE RELAXING: SEVERAL DAYS
1. FEELING NERVOUS, ANXIOUS, OR ON EDGE: SEVERAL DAYS
6. BECOMING EASILY ANNOYED OR IRRITABLE: NOT AT ALL
2. NOT BEING ABLE TO STOP OR CONTROL WORRYING: SEVERAL DAYS
3. WORRYING TOO MUCH ABOUT DIFFERENT THINGS: SEVERAL DAYS
IF YOU CHECKED OFF ANY PROBLEMS ON THIS QUESTIONNAIRE, HOW DIFFICULT HAVE THESE PROBLEMS MADE IT FOR YOU TO DO YOUR WORK, TAKE CARE OF THINGS AT HOME, OR GET ALONG WITH OTHER PEOPLE: NOT DIFFICULT AT ALL
5. BEING SO RESTLESS THAT IT IS HARD TO SIT STILL: SEVERAL DAYS

## 2025-07-23 ASSESSMENT — PATIENT HEALTH QUESTIONNAIRE - PHQ9
SUM OF ALL RESPONSES TO PHQ9 QUESTIONS 1 AND 2: 0
2. FEELING DOWN, DEPRESSED OR HOPELESS: NOT AT ALL
1. LITTLE INTEREST OR PLEASURE IN DOING THINGS: NOT AT ALL

## 2025-07-23 ASSESSMENT — PAIN SCALES - GENERAL: PAINLEVEL_OUTOF10: 0-NO PAIN

## 2025-07-23 NOTE — PATIENT INSTRUCTIONS
Marisela-it is nice to see you again  Anxiety-continue sertraline 50 mg daily, hydroxyzine 25 mg twice a day as needed. Stress management, healthy diet  Follow up in 3 months   BP-continue daily medication regimen, monitor at home twice a day. Reach out if trends above goal of 130/80 as discussed

## 2025-07-23 NOTE — ASSESSMENT & PLAN NOTE
Orders:    albuterol (Ventolin HFA) 90 mcg/actuation inhaler; Inhale 2 puffs every 4 hours if needed for wheezing or shortness of breath.

## 2025-07-23 NOTE — ASSESSMENT & PLAN NOTE
Stable  Continue current regimen  Home BP monitor      Losartan-Hydrochlorothiazide 100-25 mg daily  Metroprolol succinate 50 mg XL bid  Egfr-100 4/26/25  ASCVD risk 11.6%               Rosuvastatin, asa  Calcium CT score  >200  ECHO 2/22/24-normal LVSF, EF 60-65%  Nuclear Stress test -normal 1/19/24

## 2025-08-13 ENCOUNTER — HOSPITAL ENCOUNTER (OUTPATIENT)
Dept: RADIOLOGY | Facility: CLINIC | Age: 60
Discharge: HOME | End: 2025-08-13
Payer: COMMERCIAL

## 2025-08-13 DIAGNOSIS — R92.8 OTHER ABNORMAL AND INCONCLUSIVE FINDINGS ON DIAGNOSTIC IMAGING OF BREAST: ICD-10-CM

## 2025-08-13 DIAGNOSIS — Z12.31 ENCOUNTER FOR SCREENING MAMMOGRAM FOR MALIGNANT NEOPLASM OF BREAST: ICD-10-CM

## 2025-08-13 PROCEDURE — 76642 ULTRASOUND BREAST LIMITED: CPT | Mod: RT

## 2025-08-13 PROCEDURE — 77062 BREAST TOMOSYNTHESIS BI: CPT

## 2025-08-13 PROCEDURE — 76642 ULTRASOUND BREAST LIMITED: CPT | Performed by: RADIOLOGY

## 2025-08-13 PROCEDURE — 76982 USE 1ST TARGET LESION: CPT

## 2025-08-13 PROCEDURE — 77066 DX MAMMO INCL CAD BI: CPT | Performed by: RADIOLOGY

## 2025-08-13 PROCEDURE — 77062 BREAST TOMOSYNTHESIS BI: CPT | Performed by: RADIOLOGY

## 2025-10-22 ENCOUNTER — APPOINTMENT (OUTPATIENT)
Dept: PRIMARY CARE | Facility: CLINIC | Age: 60
End: 2025-10-22
Payer: COMMERCIAL